# Patient Record
Sex: FEMALE | Race: WHITE | NOT HISPANIC OR LATINO | Employment: OTHER | URBAN - METROPOLITAN AREA
[De-identification: names, ages, dates, MRNs, and addresses within clinical notes are randomized per-mention and may not be internally consistent; named-entity substitution may affect disease eponyms.]

---

## 2017-08-29 ENCOUNTER — TRANSCRIBE ORDERS (OUTPATIENT)
Dept: ADMINISTRATIVE | Facility: HOSPITAL | Age: 67
End: 2017-08-29

## 2017-08-29 DIAGNOSIS — Z12.31 VISIT FOR SCREENING MAMMOGRAM: Primary | ICD-10-CM

## 2017-10-03 ENCOUNTER — HOSPITAL ENCOUNTER (OUTPATIENT)
Dept: RADIOLOGY | Facility: HOSPITAL | Age: 67
Discharge: HOME | End: 2017-10-03
Attending: OBSTETRICS & GYNECOLOGY
Payer: COMMERCIAL

## 2017-10-03 DIAGNOSIS — Z12.31 VISIT FOR SCREENING MAMMOGRAM: ICD-10-CM

## 2017-10-03 PROCEDURE — G0202 SCR MAMMO BI INCL CAD: HCPCS

## 2017-10-12 ENCOUNTER — HOSPITAL ENCOUNTER (OUTPATIENT)
Dept: ULTRASOUND IMAGING | Facility: CLINIC | Age: 67
Discharge: HOME | End: 2017-10-12
Payer: COMMERCIAL

## 2017-10-12 ENCOUNTER — HOSPITAL ENCOUNTER (OUTPATIENT)
Dept: MAMMOGRAPHY | Facility: CLINIC | Age: 67
Discharge: HOME | End: 2017-10-12
Payer: COMMERCIAL

## 2017-10-12 DIAGNOSIS — R92.8 ABNORMAL MAMMOGRAM: ICD-10-CM

## 2017-10-12 PROCEDURE — 76642 ULTRASOUND BREAST LIMITED: CPT

## 2017-10-12 PROCEDURE — G0206 DX MAMMO INCL CAD UNI: HCPCS

## 2017-10-12 PROCEDURE — G0279 TOMOSYNTHESIS, MAMMO: HCPCS

## 2018-03-02 ENCOUNTER — TRANSCRIBE ORDERS (OUTPATIENT)
Dept: ADMINISTRATIVE | Facility: HOSPITAL | Age: 68
End: 2018-03-02

## 2018-03-02 DIAGNOSIS — R92.8 ABNORMAL MAMMOGRAM: Primary | ICD-10-CM

## 2018-04-30 ENCOUNTER — HOSPITAL ENCOUNTER (OUTPATIENT)
Dept: MAMMOGRAPHY | Facility: CLINIC | Age: 68
Discharge: HOME | End: 2018-04-30
Payer: COMMERCIAL

## 2018-04-30 ENCOUNTER — HOSPITAL ENCOUNTER (OUTPATIENT)
Dept: ULTRASOUND IMAGING | Facility: CLINIC | Age: 68
Discharge: HOME | End: 2018-04-30
Payer: COMMERCIAL

## 2018-04-30 DIAGNOSIS — R92.8 ABNORMAL MAMMOGRAM: ICD-10-CM

## 2018-04-30 PROCEDURE — 76642 ULTRASOUND BREAST LIMITED: CPT

## 2018-04-30 PROCEDURE — G0279 TOMOSYNTHESIS, MAMMO: HCPCS

## 2018-04-30 PROCEDURE — 77065 DX MAMMO INCL CAD UNI: CPT

## 2018-07-27 ENCOUNTER — TELEPHONE (OUTPATIENT)
Dept: FAMILY MEDICINE CLINIC | Facility: CLINIC | Age: 68
End: 2018-07-27

## 2018-07-27 NOTE — TELEPHONE ENCOUNTER
Patient called she said that she had fallen about 6 days ago and she said that she hurt her ribs she would like to know if she can have something for pain her # [phone] asked for presp to be called into [facility] at [phone]  Fax [phone] address [address] [place] [place] [address] [place] [address] [place] [address]

## 2018-07-28 ENCOUNTER — TREATMENT (OUTPATIENT)
Dept: FAMILY MEDICINE CLINIC | Facility: CLINIC | Age: 68
End: 2018-07-28

## 2018-07-28 DIAGNOSIS — R52 PAIN: Primary | ICD-10-CM

## 2018-07-28 RX ORDER — TRAMADOL HYDROCHLORIDE 50 MG/1
50 TABLET ORAL EVERY 6 HOURS PRN
Qty: 30 TABLET | Refills: 0 | Status: SHIPPED | OUTPATIENT
Start: 2018-07-28 | End: 2018-10-02 | Stop reason: ALTCHOICE

## 2018-08-22 ENCOUNTER — TELEPHONE (OUTPATIENT)
Dept: FAMILY MEDICINE CLINIC | Facility: CLINIC | Age: 68
End: 2018-08-22

## 2018-08-22 NOTE — TELEPHONE ENCOUNTER
Called patient to inform her that her appointment for 9/28/19 at 56 am with dr Sade Resendez is cancelled he will not be in the office this day and appointment was rescheduled to 10/2/18 at 56 am with dr Sade Resendez patient aware of new appointment date and time

## 2018-09-24 ENCOUNTER — TRANSCRIBE ORDERS (OUTPATIENT)
Dept: ADMINISTRATIVE | Facility: HOSPITAL | Age: 68
End: 2018-09-24

## 2018-09-24 DIAGNOSIS — Z09 FOLLOW UP: Primary | ICD-10-CM

## 2018-10-02 ENCOUNTER — OFFICE VISIT (OUTPATIENT)
Dept: FAMILY MEDICINE CLINIC | Facility: CLINIC | Age: 68
End: 2018-10-02
Payer: COMMERCIAL

## 2018-10-02 VITALS
BODY MASS INDEX: 25.42 KG/M2 | HEIGHT: 60 IN | SYSTOLIC BLOOD PRESSURE: 112 MMHG | WEIGHT: 129.5 LBS | DIASTOLIC BLOOD PRESSURE: 62 MMHG | RESPIRATION RATE: 18 BRPM | HEART RATE: 69 BPM | TEMPERATURE: 96.8 F | OXYGEN SATURATION: 98 %

## 2018-10-02 DIAGNOSIS — I48.92 ATRIAL FLUTTER, UNSPECIFIED TYPE (HCC): Primary | ICD-10-CM

## 2018-10-02 DIAGNOSIS — R73.9 HYPERGLYCEMIA: ICD-10-CM

## 2018-10-02 DIAGNOSIS — D50.9 IRON DEFICIENCY ANEMIA, UNSPECIFIED IRON DEFICIENCY ANEMIA TYPE: ICD-10-CM

## 2018-10-02 DIAGNOSIS — Z86.010 HX OF COLONIC POLYPS: ICD-10-CM

## 2018-10-02 DIAGNOSIS — E78.5 HYPERLIPIDEMIA, UNSPECIFIED HYPERLIPIDEMIA TYPE: ICD-10-CM

## 2018-10-02 DIAGNOSIS — R92.8 ABNORMAL MAMMOGRAM: ICD-10-CM

## 2018-10-02 DIAGNOSIS — Z82.49 FAMILY HISTORY OF ANEURYSM: ICD-10-CM

## 2018-10-02 PROBLEM — Z86.0100 HX OF COLONIC POLYPS: Status: ACTIVE | Noted: 2018-01-26

## 2018-10-02 LAB
EST. AVERAGE GLUCOSE BLD GHB EST-MCNC: 120 MG/DL
HBA1C MFR BLD: 5.8 % (ref 4.2–6.3)

## 2018-10-02 PROCEDURE — 3725F SCREEN DEPRESSION PERFORMED: CPT

## 2018-10-02 PROCEDURE — 83036 HEMOGLOBIN GLYCOSYLATED A1C: CPT | Performed by: FAMILY MEDICINE

## 2018-10-02 PROCEDURE — 99214 OFFICE O/P EST MOD 30 MIN: CPT | Performed by: FAMILY MEDICINE

## 2018-10-02 PROCEDURE — 90662 IIV NO PRSV INCREASED AG IM: CPT

## 2018-10-02 PROCEDURE — G0008 ADMIN INFLUENZA VIRUS VAC: HCPCS

## 2018-10-02 PROCEDURE — G0439 PPPS, SUBSEQ VISIT: HCPCS | Performed by: FAMILY MEDICINE

## 2018-10-02 PROCEDURE — 1125F AMNT PAIN NOTED PAIN PRSNT: CPT

## 2018-10-02 PROCEDURE — 36415 COLL VENOUS BLD VENIPUNCTURE: CPT | Performed by: FAMILY MEDICINE

## 2018-10-02 RX ORDER — LORAZEPAM 0.5 MG/1
1 TABLET ORAL EVERY 6 HOURS PRN
COMMUNITY
Start: 2017-09-26 | End: 2018-10-02 | Stop reason: ALTCHOICE

## 2018-10-02 RX ORDER — ATORVASTATIN CALCIUM 20 MG/1
1 TABLET, FILM COATED ORAL DAILY
COMMUNITY
End: 2018-10-02 | Stop reason: ALTCHOICE

## 2018-10-02 RX ORDER — METOPROLOL SUCCINATE 50 MG/1
1 TABLET, EXTENDED RELEASE ORAL 2 TIMES DAILY
COMMUNITY
Start: 2016-09-22

## 2018-10-02 RX ORDER — HYDROCODONE BITARTRATE AND ACETAMINOPHEN 5; 325 MG/1; MG/1
1 TABLET ORAL EVERY 6 HOURS PRN
COMMUNITY
End: 2018-10-02 | Stop reason: ALTCHOICE

## 2018-10-02 NOTE — PROGRESS NOTES
Assessment and Plan:    Problem List Items Addressed This Visit     Atrial flutter ([address] ) - Primary    Relevant Medications    metoprolol succinate (TOPROL-XL) 50 mg 24 hr tablet    metoprolol tartrate (LOPRESSOR) 25 mg tablet    Hx of colonic polyps    Hyperlipidemia     intollerant of atorva ;     FR = 7%          Iron deficiency anemia      Other Visit Diagnoses     Family history of aneurysm        Relevant Orders    influenza vaccine, 0340-8001, high-dose, PF 0 5 mL, for patients 65 yr+ (FLUZONE HIGH-DOSE)     abdominal aorta screening aaa        Health Maintenance Due   Topic Date Due    Depression Screening PHQ  1950    Fall Risk  11/03/2013    Urinary Incontinence Screening  11/03/2013    INFLUENZA VACCINE  09/01/2018         HPI:  Juan Ledbetter is a 69 y o  female here for her Subsequent Wellness Visit  Patient Active Problem List   Diagnosis    Atrial flutter ([facility] [address] )    Hx of colonic polyps    Hyperlipidemia    Iron deficiency anemia     Past Medical History:   Diagnosis Date    Bulging of cervical intervertebral disc     Bulging disk neck      Colon polyp 03/2009    History of narrow angle glaucoma 2012    SVT (supraventricular tachycardia) (HCC)     Palpitations      Past Surgical History:   Procedure Laterality Date    COLONOSCOPY  03/25/2009    POLYPECTOMY  03/25/2009    REFRACTIVE SURGERY       Family History   Problem Relation Age of Onset    Cervical polyp Father     Prostate cancer Father     Prostate cancer Brother     Kidney cancer Brother     Cancer Family         Colorectal and polyps     History   Smoking Status    Never Smoker   Smokeless Tobacco    Never Used     History   Alcohol Use    Yes     Comment: occasional      History   Drug Use No       Current Outpatient Prescriptions   Medication Sig Dispense Refill    aspirin 81 MG tablet Take 1 tablet by mouth every 24 hours      metoprolol succinate (TOPROL-XL) 50 mg 24 hr tablet Take 1 tablet by mouth 2 (two) times a day        metoprolol tartrate (LOPRESSOR) 25 mg tablet Take 1 tablet by mouth daily as needed        Multiple Vitamins-Minerals (MULTIVITAMIN ADULT PO) Take 1 tablet by mouth daily       No current facility-administered medications for this visit  Allergies   Allergen Reactions    Sulfamethoxazole      Other reaction(s): Rash    Trimethoprim Rash    Codeine Nausea Only and Other (See Comments)     Other reaction(s): GI Intolerance  N/V  Other reaction(s): Nausea & vomiting  Other reaction(s): Other (See Comments)  Other reaction(s): GI Intolerance  N/V    Latex Itching    Other Itching    Sulfa Antibiotics Swelling    Sulfamethoxazole-Trimethoprim Other (See Comments)     facial swelling  Other reaction(s): Other (See Comments)  facial swelling     Immunization History   Administered Date(s) Administered    Influenza 09/28/2015, 09/22/2016, 09/26/2017    Influenza Split 01/16/2014    Influenza Split High Dose Preservative Free IM 09/22/2016, 09/26/2017    Influenza TIV (IM) 11/13/2012, 10/27/2014, 09/28/2015    Pneumococcal Conjugate 13-Valent 09/28/2015    Pneumococcal Polysaccharide PPV23 10/27/2014    Tdap 12/31/2015       Patient Care Team:  Trinidad Wong MD as PCP - General    Medicare Screening Tests and Risk Assessments:  Last Medicare Wellness visit information reviewed, patient interviewed, no change since last AWV  Health Risk Assessment:  Patient rates overall health as excellent  Patient feels that their physical health rating is Much better  Eyesight was rated as Same  Hearing was rated as Same  Patient feels that their emotional and mental health rating is Much better  Pain experienced by patient in the last 7 days has been None  Patient states that she has experienced no weight loss or gain in last 6 months  Emotional/Mental Health:  Patient has been feeling nervous/anxious      PHQ-9 Depression Screening:    Frequency of the following problems over the past two weeks:      1  Little interest or pleasure in doing things: 0 - not at all      2  Feeling down, depressed, or hopeless: 0 - not at all  PHQ-2 Score: 0          Broken Bones/Falls: Fall Risk Assessment:    In the past year, patient has experienced: No history of falling in past year          Bladder/Bowel:  Patient has not leaked urine accidently in the last six months  Patient reports no loss of bowel control  Immunizations:  Patient has had a flu vaccination within the last year  Patient has received a pneumonia shot  Patient has not received a shingles shot  Patient has received tetanus/diphtheria shot  Home Safety:  Patient does not have trouble with stairs inside or outside of their home  Patient currently reports that there are safety hazards present in home , working smoke alarms, working carbon monoxide detectors  Preventative Screening/Counseling:      Cardiovascular:      General: Screening Current          Diabetes:      General: Screening Current          Colorectal Cancer:      General: Screening Current          Breast Cancer:      General: Screening Current          Cervical Cancer:      General: Screening Current          Osteoporosis:      General: Risks and Benefits Discussed      Comments: Pt wants to wait one yr to repeat the dexa        AAA:      Family history of abdominal aortic aneurysm  General: Screening Not Indicated      Comments:  Mother w/ same         Glaucoma:      General: Screening Current          HIV:      General: Screening Not Indicated          Hepatitis C:      General: Screening Not Indicated        Advanced Directives:   Patient has living will for healthcare,

## 2018-10-11 ENCOUNTER — HOSPITAL ENCOUNTER (OUTPATIENT)
Dept: ULTRASOUND IMAGING | Facility: CLINIC | Age: 68
Discharge: HOME | End: 2018-10-11
Payer: COMMERCIAL

## 2018-10-11 ENCOUNTER — HOSPITAL ENCOUNTER (OUTPATIENT)
Dept: MAMMOGRAPHY | Facility: CLINIC | Age: 68
Discharge: HOME | End: 2018-10-11
Payer: COMMERCIAL

## 2018-10-11 DIAGNOSIS — R92.2 INCONCLUSIVE MAMMOGRAPHY: ICD-10-CM

## 2018-10-11 DIAGNOSIS — R92.8 ABNORMAL MAMMOGRAM: ICD-10-CM

## 2018-10-11 PROCEDURE — 77066 DX MAMMO INCL CAD BI: CPT

## 2018-10-11 PROCEDURE — 76642 ULTRASOUND BREAST LIMITED: CPT

## 2018-10-11 PROCEDURE — G0279 TOMOSYNTHESIS, MAMMO: HCPCS

## 2018-10-17 ENCOUNTER — OFFICE VISIT (OUTPATIENT)
Dept: FAMILY MEDICINE CLINIC | Facility: CLINIC | Age: 68
End: 2018-10-17
Payer: COMMERCIAL

## 2018-10-17 VITALS
OXYGEN SATURATION: 92 % | SYSTOLIC BLOOD PRESSURE: 118 MMHG | RESPIRATION RATE: 18 BRPM | DIASTOLIC BLOOD PRESSURE: 68 MMHG | HEART RATE: 75 BPM | TEMPERATURE: 98.4 F

## 2018-10-17 DIAGNOSIS — M67.40 GANGLION: Primary | ICD-10-CM

## 2018-10-17 PROCEDURE — 99213 OFFICE O/P EST LOW 20 MIN: CPT | Performed by: FAMILY MEDICINE

## 2018-10-17 PROCEDURE — 4040F PNEUMOC VAC/ADMIN/RCVD: CPT | Performed by: FAMILY MEDICINE

## 2018-10-17 NOTE — PROGRESS NOTES
Assessment/Plan:    No problem-specific Assessment & Plan notes found for this encounter  Diagnoses and all orders for this visit:    Ganglion          Subjective:      Patient ID: Paris Yu is a 69 y o  female  Patient comes today with a 5 or 6 week history of a mass on the left palm  The following portions of the patient's history were reviewed and updated as appropriate: allergies, current medications, past family history, past medical history, past social history, past surgical history and problem list     Review of Systems   Musculoskeletal:        Hand pain : no locking          Objective:  Vitals:    10/17/18 1017   BP: 118/68   BP Location: Left arm   Patient Position: Sitting   Cuff Size: Standard   Pulse: 75   Resp: 18   Temp: 98 4 °F (36 9 °C)   TempSrc: Temporal   SpO2: 92%      Physical Exam   Skin:   Ganglion cyst of L hand          Ganglion cyst   Try to give it another 4-6 weeks to see if it will resolve  Otherwise a hand surgeon could help

## 2018-10-30 LAB
ALBUMIN SERPL-MCNC: 4 G/DL (ref 3.6–5.1)
ALBUMIN/GLOB SERPL: 1.4 (CALC) (ref 1–2.5)
ALP SERPL-CCNC: 86 U/L (ref 33–130)
ALT SERPL-CCNC: 35 U/L (ref 6–29)
AST SERPL-CCNC: 37 U/L (ref 10–35)
BILIRUB DIRECT SERPL-MCNC: 0.1 MG/DL
BILIRUB INDIRECT SERPL-MCNC: 0.4 MG/DL (CALC) (ref 0.2–1.2)
BILIRUB SERPL-MCNC: 0.5 MG/DL (ref 0.2–1.2)
GLOBULIN SER CALC-MCNC: 2.8 G/DL (CALC) (ref 1.9–3.7)
PROT SERPL-MCNC: 6.8 G/DL (ref 6.1–8.1)

## 2018-11-09 ENCOUNTER — TELEPHONE (OUTPATIENT)
Dept: FAMILY MEDICINE CLINIC | Facility: CLINIC | Age: 68
End: 2018-11-09

## 2018-11-09 ENCOUNTER — TREATMENT (OUTPATIENT)
Dept: FAMILY MEDICINE CLINIC | Facility: CLINIC | Age: 68
End: 2018-11-09

## 2018-11-09 DIAGNOSIS — R79.89 ELEVATED LFTS: Primary | ICD-10-CM

## 2018-12-27 LAB
HCV AB S/CO SERPL IA: 0.01
HCV AB SERPL QL IA: NORMAL

## 2019-03-21 ENCOUNTER — TRANSCRIBE ORDERS (OUTPATIENT)
Dept: ADMINISTRATIVE | Facility: HOSPITAL | Age: 69
End: 2019-03-21

## 2019-03-21 DIAGNOSIS — N64.59 ABNORMAL BREAST EXAM: Primary | ICD-10-CM

## 2019-04-09 ENCOUNTER — TELEPHONE (OUTPATIENT)
Dept: FAMILY MEDICINE CLINIC | Facility: CLINIC | Age: 69
End: 2019-04-09

## 2019-04-19 ENCOUNTER — HOSPITAL ENCOUNTER (OUTPATIENT)
Dept: MAMMOGRAPHY | Facility: CLINIC | Age: 69
Discharge: HOME | End: 2019-04-19
Payer: COMMERCIAL

## 2019-04-19 ENCOUNTER — HOSPITAL ENCOUNTER (OUTPATIENT)
Dept: ULTRASOUND IMAGING | Facility: CLINIC | Age: 69
Discharge: HOME | End: 2019-04-19
Payer: COMMERCIAL

## 2019-04-19 ENCOUNTER — TRANSCRIBE ORDERS (OUTPATIENT)
Dept: ADMINISTRATIVE | Facility: HOSPITAL | Age: 69
End: 2019-04-19

## 2019-04-19 VITALS — BODY MASS INDEX: 25.32 KG/M2 | HEIGHT: 60 IN | WEIGHT: 129 LBS

## 2019-04-19 DIAGNOSIS — N64.59 ABNORMAL BREAST EXAM: ICD-10-CM

## 2019-04-19 DIAGNOSIS — Z09 FOLLOW-UP EXAM, 3-6 MONTHS SINCE PREVIOUS EXAM: Primary | ICD-10-CM

## 2019-04-19 PROCEDURE — G0279 TOMOSYNTHESIS, MAMMO: HCPCS

## 2019-04-19 PROCEDURE — 77065 DX MAMMO INCL CAD UNI: CPT

## 2019-10-21 ENCOUNTER — HOSPITAL ENCOUNTER (OUTPATIENT)
Dept: MAMMOGRAPHY | Facility: CLINIC | Age: 69
Discharge: HOME | End: 2019-10-21
Payer: COMMERCIAL

## 2019-10-21 VITALS — WEIGHT: 128 LBS | BODY MASS INDEX: 22.68 KG/M2 | HEIGHT: 63 IN

## 2019-10-21 DIAGNOSIS — Z09 FOLLOW-UP EXAM, 3-6 MONTHS SINCE PREVIOUS EXAM: ICD-10-CM

## 2019-10-21 PROCEDURE — G0279 TOMOSYNTHESIS, MAMMO: HCPCS

## 2019-10-21 PROCEDURE — 77066 DX MAMMO INCL CAD BI: CPT

## 2019-11-14 ENCOUNTER — OFFICE VISIT (OUTPATIENT)
Dept: FAMILY MEDICINE CLINIC | Facility: CLINIC | Age: 69
End: 2019-11-14
Payer: COMMERCIAL

## 2019-11-14 VITALS
DIASTOLIC BLOOD PRESSURE: 82 MMHG | TEMPERATURE: 98.2 F | OXYGEN SATURATION: 96 % | WEIGHT: 130.6 LBS | HEART RATE: 70 BPM | SYSTOLIC BLOOD PRESSURE: 140 MMHG | HEIGHT: 62 IN | BODY MASS INDEX: 24.03 KG/M2

## 2019-11-14 DIAGNOSIS — Z86.010 HX OF COLONIC POLYPS: ICD-10-CM

## 2019-11-14 DIAGNOSIS — I48.92 ATRIAL FLUTTER, UNSPECIFIED TYPE (HCC): ICD-10-CM

## 2019-11-14 DIAGNOSIS — Z23 IMMUNIZATION DUE: Primary | ICD-10-CM

## 2019-11-14 DIAGNOSIS — E78.5 HYPERLIPIDEMIA, UNSPECIFIED HYPERLIPIDEMIA TYPE: ICD-10-CM

## 2019-11-14 DIAGNOSIS — D50.9 IRON DEFICIENCY ANEMIA, UNSPECIFIED IRON DEFICIENCY ANEMIA TYPE: ICD-10-CM

## 2019-11-14 DIAGNOSIS — Z82.49 FAMILY HISTORY OF ABDOMINAL AORTIC ANEURYSM: ICD-10-CM

## 2019-11-14 PROCEDURE — 3725F SCREEN DEPRESSION PERFORMED: CPT

## 2019-11-14 PROCEDURE — G0008 ADMIN INFLUENZA VIRUS VAC: HCPCS

## 2019-11-14 PROCEDURE — G0439 PPPS, SUBSEQ VISIT: HCPCS | Performed by: FAMILY MEDICINE

## 2019-11-14 PROCEDURE — 99214 OFFICE O/P EST MOD 30 MIN: CPT | Performed by: FAMILY MEDICINE

## 2019-11-14 PROCEDURE — 90662 IIV NO PRSV INCREASED AG IM: CPT

## 2019-11-14 NOTE — PROGRESS NOTES
Assessment and Plan:     Problem List Items Addressed This Visit     None      Visit Diagnoses     Immunization due    -  Primary           Preventive health issues were discussed with patient, and age appropriate screening tests were ordered as noted in patient's After Visit Summary  Personalized health advice and appropriate referrals for health education or preventive services given if needed, as noted in patient's After Visit Summary  History of Present Illness:     Patient presents for Medicare Annual Wellness visit    Patient Care Team:  Shukri Kim MD as PCP - General     Problem List:     Patient Active Problem List   Diagnosis    Atrial flutter ([address] )    Hx of colonic polyps    Hyperlipidemia    Iron deficiency anemia    Abnormal mammogram    Family history of aneurysm      Past Medical and Surgical History:     Past Medical History:   Diagnosis Date    Bulging of cervical intervertebral disc     Bulging disk neck      Colon polyp 03/2009    History of narrow angle glaucoma 2012    SVT (supraventricular tachycardia) (HCC)     Palpitations      Past Surgical History:   Procedure Laterality Date    BREAST BIOPSY Right 2005    Stereo    COLONOSCOPY  03/25/2009    POLYPECTOMY  03/25/2009    REFRACTIVE SURGERY        Family History:     Family History   Problem Relation Age of Onset    Cervical polyp Father     Prostate cancer Father     Colon cancer Father     Prostate cancer Brother     Kidney cancer Brother     Cancer Family         Colorectal and polyps    Colon cancer Sister 61    Colon cancer Paternal Grandmother 64    Ovarian cancer Cousin 48      Social History:     Social History     Socioeconomic History    Marital status: /Civil Union     Spouse name: None    Number of children: None    Years of education: None    Highest education level: None   Occupational History    None   Social Needs    Financial resource strain: None    Food insecurity:     Worry: None     Inability: None    Transportation needs:     Medical: None     Non-medical: None   Tobacco Use    Smoking status: Never Smoker    Smokeless tobacco: Never Used   Substance and Sexual Activity    Alcohol use: Yes     Frequency: Monthly or less     Comment: occasional    Drug use: No    Sexual activity: None   Lifestyle    Physical activity:     Days per week: None     Minutes per session: None    Stress: None   Relationships    Social connections:     Talks on phone: None     Gets together: None     Attends [employer] service: None     Active member of club or organization: None     Attends meetings of clubs or organizations: None     Relationship status: None    Intimate partner violence:     Fear of current or ex partner: None     Emotionally abused: None     Physically abused: None     Forced sexual activity: None   Other Topics Concern    None   Social History Narrative    None       Medications and Allergies:     Current Outpatient Medications   Medication Sig Dispense Refill    metoprolol succinate (TOPROL-XL) 50 mg 24 hr tablet Take 1 tablet by mouth 2 (two) times a day        metoprolol tartrate (LOPRESSOR) 25 mg tablet Take 1 tablet by mouth daily as needed        Multiple Vitamins-Minerals (MULTIVITAMIN ADULT PO) Take 1 tablet by mouth daily       No current facility-administered medications for this visit  Allergies   Allergen Reactions    Sulfa Antibiotics Swelling and Facial Swelling     Other reaction(s): Rash    Sulfamethoxazole      Other reaction(s): Rash    Trimethoprim Rash    Codeine Nausea Only and Other (See Comments)     Other reaction(s): GI Intolerance  N/V  Other reaction(s): Nausea & vomiting  Other reaction(s):  Other (See Comments)  Other reaction(s): GI Intolerance  N/V  Other reaction(s): Free Text, GI Intolerance, Nausea & vomiting  Other reaction(s): Nausea and Vomiting    Latex Itching    Other Itching    Sulfamethoxazole-Trimethoprim Other (See Comments)     facial swelling  Other reaction(s): Other (See Comments)  facial swelling      Immunizations:     Immunization History   Administered Date(s) Administered    INFLUENZA 09/28/2015, 09/22/2016, 09/26/2017    Influenza Split 01/16/2014    Influenza Split High Dose Preservative Free IM 09/22/2016, 09/26/2017    Influenza TIV (IM) 11/13/2012, 10/27/2014, 09/28/2015    Influenza, high dose seasonal 0 5 mL 10/02/2018    Pneumococcal Conjugate 13-Valent 09/05/2015    Pneumococcal Polysaccharide PPV23 10/27/2014    Tdap 12/31/2015      Health Maintenance:         Topic Date Due    MAMMOGRAM  10/21/2020    CRC Screening: Colonoscopy  03/13/2023    Hepatitis C Screening  Completed         Topic Date Due    MMR Vaccine (1 of 1 - Standard series) 11/03/1951    Varicella Vaccine (1 of 2 - 2-dose childhood series) 11/03/1951    Influenza Vaccine  07/01/2019      Medicare Health Risk Assessment:     Ht 5' 2" (1 575 m)   Wt 59 2 kg (130 lb 9 6 oz)   BMI 23 89 kg/m²      Joseph Perdue is here for her Subsequent Wellness visit  Last Medicare Wellness visit information reviewed, patient interviewed and updates made to the record today  Health Risk Assessment:   Patient rates overall health as good  Patient feels that their physical health rating is same  Eyesight was rated as same  Hearing was rated as same  Patient feels that their emotional and mental health rating is same  Pain experienced in the last 7 days has been none  Patient states that she has experienced no weight loss or gain in last 6 months  Depression Screening:   PHQ-2 Score: 0      Fall Risk Screening: In the past year, patient has experienced: no history of falling in past year      Urinary Incontinence Screening:   Patient has not leaked urine accidently in the last six months  Home Safety:  Patient does not have trouble with stairs inside or outside of their home   Patient has working smoke alarms and has no working carbon monoxide detector  Home safety hazards include: none  Nutrition:   Current diet is Regular  Medications:   Patient is currently taking over-the-counter supplements  OTC medications include: see medication list  Patient is able to manage medications  Activities of Daily Living (ADLs)/Instrumental Activities of Daily Living (IADLs):   Walk and transfer into and out of bed and chair?: Yes  Dress and groom yourself?: Yes    Bathe or shower yourself?: Yes    Feed yourself?  Yes  Do your laundry/housekeeping?: Yes  Manage your money, pay your bills and track your expenses?: Yes  Make your own meals?: Yes    Do your own shopping?: Yes    Previous Hospitalizations:   Any hospitalizations or ED visits within the last 12 months?: No      PREVENTIVE SCREENINGS      Cardiovascular Screening:    General: Screening Not Indicated and History Lipid Disorder      Colorectal Cancer Screening:     General: Screening Current      Breast Cancer Screening:     General: Screening Current      Cervical Cancer Screening:    General: Screening Not Indicated      Hepatitis C Screening:    General: Screening Current      Yuly Redd MD

## 2019-11-14 NOTE — PATIENT INSTRUCTIONS
Medicare Preventive Visit Patient Instructions  Thank you for completing your Welcome to Medicare Visit or Medicare Annual Wellness Visit today  Your next wellness visit will be due in one year (11/14/2020)  The screening/preventive services that you may require over the next 5-10 years are detailed below  Some tests may not apply to you based off risk factors and/or age  Screening tests ordered at today's visit but not completed yet may show as past due  Also, please note that scanned in results may not display below  Preventive Screenings:  Service Recommendations Previous Testing/Comments   Colorectal Cancer Screening  * Colonoscopy    * Fecal Occult Blood Test (FOBT)/Fecal Immunochemical Test (FIT)  * Fecal DNA/Cologuard Test  * Flexible Sigmoidoscopy Age: 54-65 years old   Colonoscopy: every 10 years (may be performed more frequently if at higher risk)  OR  FOBT/FIT: every 1 year  OR  Cologuard: every 3 years  OR  Sigmoidoscopy: every 5 years  Screening may be recommended earlier than age 48 if at higher risk for colorectal cancer  Also, an individualized decision between you and your healthcare provider will decide whether screening between the ages of 74-80 would be appropriate  Colonoscopy: 03/13/2018  FOBT/FIT: Not on file  Cologuard: Not on file  Sigmoidoscopy: Not on file    Screening Current     Breast Cancer Screening Age: 36 years old  Frequency: every 1-2 years  Not required if history of left and right mastectomy Mammogram: 10/21/2019    Screening Current   Cervical Cancer Screening Between the ages of 21-29, pap smear recommended once every 3 years  Between the ages of 33-67, can perform pap smear with HPV co-testing every 5 years     Recommendations may differ for women with a history of total hysterectomy, cervical cancer, or abnormal pap smears in past  Pap Smear: Not on file    Screening Not Indicated   Hepatitis C Screening Once for adults born between 1945 and 1965  More frequently in patients at high risk for Hepatitis C Hep C Antibody: 12/26/2018    Screening Current   Diabetes Screening 1-2 times per year if you're at risk for diabetes or have pre-diabetes Fasting glucose: No results in last 5 years   A1C: 5 8 %       Cholesterol Screening Once every 5 years if you don't have a lipid disorder  May order more often based on risk factors  Lipid panel: 04/21/2018    Screening Not Indicated  History Lipid Disorder     Other Preventive Screenings Covered by Medicare:  1  Abdominal Aortic Aneurysm (AAA) Screening: covered once if your at risk  You're considered to be at risk if you have a family history of AAA  2  Lung Cancer Screening: covers low dose CT scan once per year if you meet all of the following conditions: (1) Age 50-69; (2) No signs or symptoms of lung cancer; (3) Current smoker or have quit smoking within the last 15 years; (4) You have a tobacco smoking history of at least 30 pack years (packs per day multiplied by number of years you smoked); (5) You get a written order from a healthcare provider  3  Glaucoma Screening: covered annually if you're considered high risk: (1) You have diabetes OR (2) Family history of glaucoma OR (3)  aged 48 and older OR (3)  American aged 72 and older  3  Osteoporosis Screening: covered every 2 years if you meet one of the following conditions: (1) You're estrogen deficient and at risk for osteoporosis based off medical history and other findings; (2) Have a vertebral abnormality; (3) On glucocorticoid therapy for more than 3 months; (4) Have primary hyperparathyroidism; (5) On osteoporosis medications and need to assess response to drug therapy  · Last bone density test (DXA Scan): Not on file  5  HIV Screening: covered annually if you're between the age of 12-76  Also covered annually if you are younger than 13 and older than 72 with risk factors for HIV infection   For pregnant patients, it is covered up to 3 times per pregnancy  Immunizations:  Immunization Recommendations   Influenza Vaccine Annual influenza vaccination during flu season is recommended for all persons aged >= 6 months who do not have contraindications   Pneumococcal Vaccine (Prevnar and Pneumovax)  * Prevnar = PCV13  * Pneumovax = PPSV23   Adults 25-60 years old: 1-3 doses may be recommended based on certain risk factors  Adults 72 years old: Prevnar (PCV13) vaccine recommended followed by Pneumovax (PPSV23) vaccine  If already received PPSV23 since turning 65, then PCV13 recommended at least one year after PPSV23 dose  Hepatitis B Vaccine 3 dose series if at intermediate or high risk (ex: diabetes, end stage renal disease, liver disease)   Tetanus (Td) Vaccine - COST NOT COVERED BY MEDICARE PART B Following completion of primary series, a booster dose should be given every 10 years to maintain immunity against tetanus  Td may also be given as tetanus wound prophylaxis  Tdap Vaccine - COST NOT COVERED BY MEDICARE PART B Recommended at least once for all adults  For pregnant patients, recommended with each pregnancy  Shingles Vaccine (Shingrix) - COST NOT COVERED BY MEDICARE PART B  2 shot series recommended in those aged 48 and above     Health Maintenance Due:      Topic Date Due    MAMMOGRAM  10/21/2020    CRC Screening: Colonoscopy  03/13/2023    Hepatitis C Screening  Completed     Immunizations Due:      Topic Date Due    MMR Vaccine (1 of 1 - Standard series) 11/03/1951    Varicella Vaccine (1 of 2 - 2-dose childhood series) 11/03/1951    Influenza Vaccine  07/01/2019     Advance Directives   What are advance directives? Advance directives are legal documents that state your wishes and plans for medical care  These plans are made ahead of time in case you lose your ability to make decisions for yourself  Advance directives can apply to any medical decision, such as the treatments you want, and if you want to donate organs     What are the types of advance directives? There are many types of advance directives, and each state has rules about how to use them  You may choose a combination of any of the following:  · Living will: This is a written record of the treatment you want  You can also choose which treatments you do not want, which to limit, and which to stop at a certain time  This includes surgery, medicine, IV fluid, and tube feedings  · Durable power of  for healthcare [facility]): This is a written record that states who you want to make healthcare choices for you when you are unable to make them for yourself  This person, called a proxy, is usually a family member or a friend  You may choose more than 1 proxy  · Do not resuscitate (DNR) order:  A DNR order is used in case your heart stops beating or you stop breathing  It is a request not to have certain forms of treatment, such as CPR  A DNR order may be included in other types of advance directives  · Medical directive: This covers the care that you want if you are in a coma, near death, or unable to make decisions for yourself  You can list the treatments you want for each condition  Treatment may include pain medicine, surgery, blood transfusions, dialysis, IV or tube feedings, and a ventilator (breathing machine)  · Values history: This document has questions about your views, beliefs, and how you feel and think about life  This information can help others choose the care that you would choose  Why are advance directives important? An advance directive helps you control your care  Although spoken wishes may be used, it is better to have your wishes written down  Spoken wishes can be misunderstood, or not followed  Treatments may be given even if you do not want them  An advance directive may make it easier for your family to make difficult choices about your care        © Copyright [employer] 2018 Information is for End User's use only and may not be sold, redistributed or otherwise used for commercial purposes  All illustrations and images included in CareNotes® are the copyrighted property of A D A M , Inc  or [place]  Consider trying CBD topical products for any arthritic issues  The following web site is a good one : "cbdifferent"   WE RECOMMEND GETTING THE 2- DOSE SHINGLES VACCINE ([address]) FROM YOUR PHARMACY  CHECK WITH THEM ON THE COST  YOU SHOULD HAVE THIS IF OVER AGE 48, EVEN IF YOU HAVE HAD THE ORIGINAL VACCINE (ZOSTRIX)

## 2019-11-14 NOTE — PROGRESS NOTES
Assessment/Plan:    No problem-specific Assessment & Plan notes found for this encounter  Diagnoses and all orders for this visit:    Immunization due  -     influenza vaccine, 3767-1733, high-dose, PF 0 5 mL (FLUZONE HIGH-DOSE)    Atrial flutter, unspecified type ([facility] [address] )    Hx of colonic polyps    Hyperlipidemia, unspecified hyperlipidemia type    Iron deficiency anemia, unspecified iron deficiency anemia type          Subjective:      Patient ID: Temitope Argueta is a 68 y o  female  PATIENT RETURNS FOR FOLLOW-UP OF CHRONIC MEDICAL CONDITIONS  NO HOSPITAL STAYS OR EMERGENCY VISITS RECENTLY  MEDS WERE REVIEWED AND NO SIDE EFFECTS  NO NEW ISSUES  UNLESS NOTED BELOW  NO NEW MEDICAL PROVIDER REPORTED  THE CHRONIC DISEASES LISTED ABOVE ARE STABLE AND UNCHANGED/ THE PLAN OF CARE FOR THOSE WILL REMAIN UNCHANGED UNLESS NOTED BELOW  Awv/sub    Still sees cardio       The following portions of the patient's history were reviewed and updated as appropriate: allergies, current medications, past family history, past medical history, past social history, past surgical history and problem list     Review of Systems   Constitutional: Negative for activity change and appetite change  HENT: Negative for voice change  Eyes: Negative for visual disturbance  Respiratory: Negative for chest tightness and shortness of breath  Cardiovascular: Negative for chest pain, palpitations and leg swelling  Gastrointestinal: Negative for abdominal pain, blood in stool, constipation and diarrhea  Genitourinary: Negative for dysuria, vaginal bleeding and vaginal discharge  Skin: Negative for rash  Neurological: Negative for dizziness  Psychiatric/Behavioral: Negative for dysphoric mood           Objective:  Vitals:    11/14/19 1121   BP: 140/82   BP Location: Left arm   Patient Position: Sitting   Cuff Size: Adult   Pulse: 70   Temp: 98 2 °F (36 8 °C)   TempSrc: Temporal   SpO2: 96%   Weight: 59 2 kg (130 lb 9 6 oz)   Height: 5' 2" (1 575 m)      Physical Exam   Constitutional: She appears well-developed and well-nourished  HENT:   Head: Normocephalic and atraumatic  Eyes: Conjunctivae are normal    Neck: Neck supple  No thyromegaly present  Cardiovascular: Normal rate, regular rhythm, normal heart sounds and intact distal pulses  No murmur heard  Pulmonary/Chest: Effort normal and breath sounds normal  No respiratory distress  Musculoskeletal: She exhibits no edema  Lymphadenopathy:     She has no cervical adenopathy  Skin: Skin is warm and dry  Psychiatric: She has a normal mood and affect  Her behavior is normal          Patient's chronic problems that were reviewed today are stable  Meds reviewed and no changes made  Appropriate labs and imaging were ordered  Preventive measures appropriate for age and sex were reviewed with patient  Immunizations were updated as appropriate  Assessment and Plan:     Problem List Items Addressed This Visit        Cardiovascular and Mediastinum    Atrial flutter ([address] )       Other    Hx of colonic polyps    Hyperlipidemia    Iron deficiency anemia      Other Visit Diagnoses     Immunization due    -  Primary           Preventive health issues were discussed with patient, and age appropriate screening tests were ordered as noted in patient's After Visit Summary  Personalized health advice and appropriate referrals for health education or preventive services given if needed, as noted in patient's After Visit Summary       History of Present Illness:     Patient presents for Medicare Annual Wellness visit    Patient Care Team:  Romain Moctezuma MD as PCP - General     Problem List:     Patient Active Problem List   Diagnosis    Atrial flutter ([address] )    Hx of colonic polyps    Hyperlipidemia    Iron deficiency anemia    Abnormal mammogram    Family history of aneurysm      Past Medical and Surgical History:     Past Medical History:   Diagnosis Date    Bulging of cervical intervertebral disc     Bulging disk neck      Colon polyp 03/2009    History of narrow angle glaucoma 2012    SVT (supraventricular tachycardia) (HCC)     Palpitations      Past Surgical History:   Procedure Laterality Date    BREAST BIOPSY Right 2005    Stereo    COLONOSCOPY  03/25/2009    POLYPECTOMY  03/25/2009    REFRACTIVE SURGERY        Family History:     Family History   Problem Relation Age of Onset    Cervical polyp Father     Prostate cancer Father     Colon cancer Father     Prostate cancer Brother     Kidney cancer Brother     Cancer Family         Colorectal and polyps    Colon cancer Sister 61    Colon cancer Paternal Grandmother 64    Ovarian cancer Cousin 48      Social History:     Social History     Socioeconomic History    Marital status: /Civil Union     Spouse name: None    Number of children: None    Years of education: None    Highest education level: None   Occupational History    None   Social Needs    Financial resource strain: None    Food insecurity:     Worry: None     Inability: None    Transportation needs:     Medical: None     Non-medical: None   Tobacco Use    Smoking status: Never Smoker    Smokeless tobacco: Never Used   Substance and Sexual Activity    Alcohol use: Yes     Frequency: Monthly or less     Comment: occasional    Drug use: No    Sexual activity: None   Lifestyle    Physical activity:     Days per week: None     Minutes per session: None    Stress: None   Relationships    Social connections:     Talks on phone: None     Gets together: None     Attends Jewish service: None     Active member of club or organization: None     Attends meetings of clubs or organizations: None     Relationship status: None    Intimate partner violence:     Fear of current or ex partner: None     Emotionally abused: None     Physically abused: None     Forced sexual activity: None   Other Topics Concern    None   Social History Narrative  None       Medications and Allergies:     Current Outpatient Medications   Medication Sig Dispense Refill    metoprolol succinate (TOPROL-XL) 50 mg 24 hr tablet Take 1 tablet by mouth 2 (two) times a day        metoprolol tartrate (LOPRESSOR) 25 mg tablet Take 1 tablet by mouth daily as needed        Multiple Vitamins-Minerals (MULTIVITAMIN ADULT PO) Take 1 tablet by mouth daily       No current facility-administered medications for this visit  Allergies   Allergen Reactions    Sulfa Antibiotics Swelling and Facial Swelling     Other reaction(s): Rash    Sulfamethoxazole      Other reaction(s): Rash    Trimethoprim Rash    Codeine Nausea Only and Other (See Comments)     Other reaction(s): GI Intolerance  N/V  Other reaction(s): Nausea & vomiting  Other reaction(s): Other (See Comments)  Other reaction(s): GI Intolerance  N/V  Other reaction(s): Free Text, GI Intolerance, Nausea & vomiting  Other reaction(s): Nausea and Vomiting    Latex Itching    Other Itching    Sulfamethoxazole-Trimethoprim Other (See Comments)     facial swelling  Other reaction(s):  Other (See Comments)  facial swelling      Immunizations:     Immunization History   Administered Date(s) Administered    INFLUENZA 09/28/2015, 09/22/2016, 09/26/2017    Influenza Split 01/16/2014    Influenza Split High Dose Preservative Free IM 09/22/2016, 09/26/2017    Influenza TIV (IM) 11/13/2012, 10/27/2014, 09/28/2015    Influenza, high dose seasonal 0 5 mL 10/02/2018    Pneumococcal Conjugate 13-Valent 09/05/2015    Pneumococcal Polysaccharide PPV23 10/27/2014    Tdap 12/31/2015      Health Maintenance:         Topic Date Due    MAMMOGRAM  10/21/2020    CRC Screening: Colonoscopy  03/13/2023    Hepatitis C Screening  Completed         Topic Date Due    MMR Vaccine (1 of 1 - Standard series) 11/03/1951    Varicella Vaccine (1 of 2 - 2-dose childhood series) 11/03/1951    Influenza Vaccine  07/01/2019      Medicare Health Risk Assessment:     /82 (BP Location: Left arm, Patient Position: Sitting, Cuff Size: Adult)   Pulse 70   Temp 98 2 °F (36 8 °C) (Temporal)   Ht 5' 2" (1 575 m)   Wt 59 2 kg (130 lb 9 6 oz)   SpO2 96%   BMI 23 89 kg/m²      Kate Bell is here for her Subsequent Wellness visit  Last Medicare Wellness visit information reviewed, patient interviewed, no change since last AWV  Depression Screening:   PHQ-2 Score: 0      Previous Hospitalizations:   Any hospitalizations or ED visits within the last 12 months?: No      Advance Care Planning:   Living will: Yes    Durable POA for healthcare: Yes    Advanced directive: Yes      Cognitive Screening:   Provider or family/friend/caregiver concerned regarding cognition?: No    PREVENTIVE SCREENINGS      Cardiovascular Screening:    General: Screening Not Indicated and History Lipid Disorder      Colorectal Cancer Screening:     General: Screening Current      Breast Cancer Screening:     General: Screening Current      Cervical Cancer Screening:    General: Screening Not Indicated      Abdominal Aortic Aneurysm (AAA) Screening:        General: Screening Not Indicated      Lung Cancer Screening:     General: Screening Not Indicated      Hepatitis C Screening:    General: Screening Current    Other Counseling Topics:   Regular weightbearing exercise         Jaja Staley MD

## 2019-11-15 ENCOUNTER — HOSPITAL ENCOUNTER (OUTPATIENT)
Dept: RADIOLOGY | Age: 69
Discharge: HOME | End: 2019-11-15
Payer: COMMERCIAL

## 2019-11-15 DIAGNOSIS — Z82.49 FAMILY HISTORY OF ABDOMINAL AORTIC ANEURYSM: ICD-10-CM

## 2019-11-15 PROCEDURE — 76706 US ABDL AORTA SCREEN AAA: CPT

## 2020-10-09 ENCOUNTER — TELEPHONE (OUTPATIENT)
Dept: FAMILY MEDICINE CLINIC | Facility: CLINIC | Age: 70
End: 2020-10-09

## 2020-10-13 ENCOUNTER — CLINICAL SUPPORT (OUTPATIENT)
Dept: FAMILY MEDICINE CLINIC | Facility: CLINIC | Age: 70
End: 2020-10-13
Payer: COMMERCIAL

## 2020-10-13 VITALS — TEMPERATURE: 97.9 F

## 2020-10-13 DIAGNOSIS — Z23 ENCOUNTER FOR IMMUNIZATION: Primary | ICD-10-CM

## 2020-10-13 PROCEDURE — G0008 ADMIN INFLUENZA VIRUS VAC: HCPCS

## 2020-10-13 PROCEDURE — 90662 IIV NO PRSV INCREASED AG IM: CPT

## 2020-11-19 ENCOUNTER — OFFICE VISIT (OUTPATIENT)
Dept: FAMILY MEDICINE CLINIC | Facility: CLINIC | Age: 70
End: 2020-11-19
Payer: COMMERCIAL

## 2020-11-19 VITALS
HEIGHT: 62 IN | TEMPERATURE: 97.2 F | WEIGHT: 128.9 LBS | BODY MASS INDEX: 23.72 KG/M2 | DIASTOLIC BLOOD PRESSURE: 72 MMHG | OXYGEN SATURATION: 99 % | SYSTOLIC BLOOD PRESSURE: 132 MMHG | HEART RATE: 75 BPM

## 2020-11-19 DIAGNOSIS — Z86.010 HX OF COLONIC POLYPS: ICD-10-CM

## 2020-11-19 DIAGNOSIS — E78.5 HYPERLIPIDEMIA, UNSPECIFIED HYPERLIPIDEMIA TYPE: ICD-10-CM

## 2020-11-19 DIAGNOSIS — D50.9 IRON DEFICIENCY ANEMIA, UNSPECIFIED IRON DEFICIENCY ANEMIA TYPE: ICD-10-CM

## 2020-11-19 DIAGNOSIS — I48.92 ATRIAL FLUTTER, UNSPECIFIED TYPE (HCC): Primary | ICD-10-CM

## 2020-11-19 DIAGNOSIS — Z00.00 MEDICARE ANNUAL WELLNESS VISIT, SUBSEQUENT: Chronic | ICD-10-CM

## 2020-11-19 DIAGNOSIS — R92.8 ABNORMAL MAMMOGRAM: ICD-10-CM

## 2020-11-19 LAB
CHOLEST SERPL-MCNC: 271 MG/DL (ref 50–200)
HDLC SERPL-MCNC: 81 MG/DL
LDLC SERPL CALC-MCNC: 150 MG/DL (ref 0–100)
NONHDLC SERPL-MCNC: 190 MG/DL
TRIGL SERPL-MCNC: 198 MG/DL

## 2020-11-19 PROCEDURE — 80061 LIPID PANEL: CPT | Performed by: FAMILY MEDICINE

## 2020-11-19 PROCEDURE — G0439 PPPS, SUBSEQ VISIT: HCPCS | Performed by: FAMILY MEDICINE

## 2020-11-19 PROCEDURE — 99214 OFFICE O/P EST MOD 30 MIN: CPT | Performed by: FAMILY MEDICINE

## 2020-11-19 PROCEDURE — 36415 COLL VENOUS BLD VENIPUNCTURE: CPT | Performed by: FAMILY MEDICINE

## 2021-01-05 ENCOUNTER — TRANSCRIBE ORDERS (OUTPATIENT)
Dept: ADMINISTRATIVE | Facility: HOSPITAL | Age: 71
End: 2021-01-05

## 2021-01-05 DIAGNOSIS — Z12.31 ENCOUNTER FOR SCREENING MAMMOGRAM FOR MALIGNANT NEOPLASM OF BREAST: Primary | ICD-10-CM

## 2021-01-12 ENCOUNTER — HOSPITAL ENCOUNTER (OUTPATIENT)
Dept: RADIOLOGY | Age: 71
Discharge: HOME | End: 2021-01-12
Payer: COMMERCIAL

## 2021-01-12 VITALS — WEIGHT: 125 LBS | BODY MASS INDEX: 23 KG/M2 | HEIGHT: 62 IN

## 2021-01-12 DIAGNOSIS — Z12.31 ENCOUNTER FOR SCREENING MAMMOGRAM FOR MALIGNANT NEOPLASM OF BREAST: ICD-10-CM

## 2021-01-12 PROCEDURE — 77067 SCR MAMMO BI INCL CAD: CPT

## 2021-01-12 PROCEDURE — 77063 BREAST TOMOSYNTHESIS BI: CPT

## 2021-09-27 NOTE — PRE-PROCEDURE INSTRUCTIONS
Pre-Surgery Instructions:   Medication Instructions    metoprolol succinate (TOPROL-XL) 50 mg 24 hr tablet Instructed to take per normal schedule including DOS with sips water    metoprolol tartrate (LOPRESSOR) 25 mg tablet Instructed pt that may continue to use as prescribed as needed up to and including DOS    Multiple Vitamins-Minerals (MULTIVITAMIN ADULT PO) Instructed to HOLD 7 days prior to surgery - by surgeon instruction sheet - pt reports stopped on 9/26/21     Reviewed all medications and instructions for DOS  Reviewed all showering instructions and COVID visitation policy  Pt aware that [facility] is location for DOS, instructed that pt pre op nurse will call on 10/1/21 to give specific instructions for DOS  Pt instructed to bring photo ID and insurance card for DOS, remove all jewelry and  NO valuables for DOS  Pt instructed to use only Tylenol between now 9/27/21 and DOS, NO NSAID products  Pt informed transport is needed for DOS due to receiving anesthesia  Instructed patient to minimize alcohol use prior to surgery  No alcohol 24 hours prior to surgery to reduce risk of dehydration  Pt verbalized understanding of all instructions given and reviewed for DOS  My Surgical Experience    The following information was developed to assist you to prepare for your operation  What do I need to do before coming to the hospital?   Arrange for a responsible person to drive you to and from the hospital    Arrange care for your children at home  Children are not allowed in the recovery areas of the hospital   Plan to wear clothing that is easy to put on and take off  If you are having shoulder surgery, wear a shirt that buttons or zippers in the front  Bathing  o Shower the evening before and the morning of your surgery with an antibacterial soap   Please refer to the Pre Op Showering Instructions for Surgery Patients Sheet   o Remove nail polish and all body piercing jewelry  o Do not shave any body part for at least 24 hours before surgery-this includes face, arms, legs and upper body  Food  o Nothing to eat or drink after midnight the night before your surgery  This includes candy and chewing gum  o Exception: If your surgery is after 12:00pm (noon), you may have clear liquids such as 7-Up®, ginger ale, apple or cranberry juice, Jell-O®, water, or clear broth until 8:00 am  o Do not drink milk or juice with pulp on the morning before surgery  o Do not drink alcohol 24 hours before surgery  Medicine  o Follow instructions you received from your surgeon about which medicines you may take on the day of surgery  o If instructed to take medicine on the morning of surgery, take pills with just a small sip of water  Call your prescribing doctor for specific infroamtion on what to do if you take insulin    What should I bring to the hospital?    Bring:  Trish Mitchellel or a walker, if you have them, for foot or knee surgery   A list of the daily medicines, vitamins, minerals, herbals and nutritional supplements you take  Include the dosages of medicines and the time you take them each day   Glasses, dentures or hearing aids   Minimal clothing; you will be wearing hospital sleepwear   Photo ID; required to verify your identity   If you have a Living Will or Power of , bring a copy of the documents   If you have an ostomy, bring an extra pouch and any supplies you use    Do not bring   Medicines or inhalers   Money, valuables or jewelry    What other information should I know about the day of surgery?  Notify your surgeons if you develop a cold, sore throat, cough, fever, rash or any other illness     Report to the Ambulatory Surgical/Same Day Surgery Unit   You will be instructed to stop at Registration only if you have not been pre-registered   Inform your  fi they do not stay that they will be asked by the staff to leave a phone number where they can be reached   Be available to be reached before surgery  In the event the operating room schedule changes, you may be asked to come in earlier or later than expected    *It is important to tell your doctor and others involved in your health care if you are taking or have been taking any non-prescription drugs, vitamins, minerals, herbals or other nutritional supplements   Any of these may interact with some food or medicines and cause a reaction

## 2021-10-01 ENCOUNTER — ANESTHESIA EVENT (OUTPATIENT)
Dept: PERIOP | Facility: HOSPITAL | Age: 71
End: 2021-10-01
Payer: COMMERCIAL

## 2021-10-04 ENCOUNTER — HOSPITAL ENCOUNTER (OUTPATIENT)
Facility: HOSPITAL | Age: 71
Setting detail: OUTPATIENT SURGERY
Discharge: HOME | End: 2021-10-04
Attending: OBSTETRICS & GYNECOLOGY | Admitting: OBSTETRICS & GYNECOLOGY
Payer: COMMERCIAL

## 2021-10-04 ENCOUNTER — ANESTHESIA (OUTPATIENT)
Dept: PERIOP | Facility: HOSPITAL | Age: 71
End: 2021-10-04
Payer: COMMERCIAL

## 2021-10-04 VITALS
HEIGHT: 62 IN | TEMPERATURE: 97.4 F | DIASTOLIC BLOOD PRESSURE: 52 MMHG | RESPIRATION RATE: 14 BRPM | OXYGEN SATURATION: 95 % | HEART RATE: 66 BPM | SYSTOLIC BLOOD PRESSURE: 104 MMHG | WEIGHT: 121 LBS | BODY MASS INDEX: 22.26 KG/M2

## 2021-10-04 DIAGNOSIS — N81.11 CYSTOCELE, MIDLINE: ICD-10-CM

## 2021-10-04 DIAGNOSIS — N81.2 INCOMPLETE UTEROVAGINAL PROLAPSE: ICD-10-CM

## 2021-10-04 DIAGNOSIS — N39.3 STRESS INCONTINENCE: Primary | ICD-10-CM

## 2021-10-04 PROBLEM — N36.41 HYPERMOBILITY OF URETHRA: Status: ACTIVE | Noted: 2021-10-04

## 2021-10-04 PROBLEM — N81.84 PELVIC MUSCLE WASTING: Status: ACTIVE | Noted: 2021-10-04

## 2021-10-04 PROBLEM — I47.1 PAROXYSMAL SVT (SUPRAVENTRICULAR TACHYCARDIA) (HCC): Status: ACTIVE | Noted: 2021-10-04

## 2021-10-04 PROBLEM — I47.10 PAROXYSMAL SVT (SUPRAVENTRICULAR TACHYCARDIA): Status: ACTIVE | Noted: 2021-10-04

## 2021-10-04 PROCEDURE — C1889 IMPLANT/INSERT DEVICE, NOC: HCPCS | Performed by: OBSTETRICS & GYNECOLOGY

## 2021-10-04 DEVICE — FIXATION KIT POP ENPLACE (NEUGUIDE): Type: IMPLANTABLE DEVICE | Site: VAGINA | Status: FUNCTIONAL

## 2021-10-04 DEVICE — MID-URETHRAL SLING ASSEMBLY, FLAT NEEDLES
Type: IMPLANTABLE DEVICE | Site: VAGINA | Status: FUNCTIONAL
Brand: ATHENA SURGICAL RMUS SYSTEM

## 2021-10-04 RX ORDER — EPHEDRINE SULFATE 50 MG/ML
INJECTION INTRAVENOUS AS NEEDED
Status: DISCONTINUED | OUTPATIENT
Start: 2021-10-04 | End: 2021-10-04

## 2021-10-04 RX ORDER — ONDANSETRON 2 MG/ML
INJECTION INTRAMUSCULAR; INTRAVENOUS AS NEEDED
Status: DISCONTINUED | OUTPATIENT
Start: 2021-10-04 | End: 2021-10-04

## 2021-10-04 RX ORDER — ONDANSETRON 2 MG/ML
4 INJECTION INTRAMUSCULAR; INTRAVENOUS EVERY 6 HOURS PRN
Status: DISCONTINUED | OUTPATIENT
Start: 2021-10-04 | End: 2021-10-04 | Stop reason: HOSPADM

## 2021-10-04 RX ORDER — FUROSEMIDE 10 MG/ML
INJECTION INTRAMUSCULAR; INTRAVENOUS AS NEEDED
Status: DISCONTINUED | OUTPATIENT
Start: 2021-10-04 | End: 2021-10-04

## 2021-10-04 RX ORDER — FENTANYL CITRATE 50 UG/ML
25 INJECTION, SOLUTION INTRAMUSCULAR; INTRAVENOUS
Status: DISCONTINUED | OUTPATIENT
Start: 2021-10-04 | End: 2021-10-04 | Stop reason: HOSPADM

## 2021-10-04 RX ORDER — IBUPROFEN 600 MG/1
600 TABLET ORAL EVERY 6 HOURS PRN
Status: DISCONTINUED | OUTPATIENT
Start: 2021-10-04 | End: 2021-10-04 | Stop reason: HOSPADM

## 2021-10-04 RX ORDER — ACETAMINOPHEN 325 MG/1
975 TABLET ORAL EVERY 6 HOURS PRN
Status: DISCONTINUED | OUTPATIENT
Start: 2021-10-04 | End: 2021-10-04 | Stop reason: HOSPADM

## 2021-10-04 RX ORDER — SODIUM CHLORIDE 9 MG/ML
125 INJECTION, SOLUTION INTRAVENOUS CONTINUOUS
Status: DISCONTINUED | OUTPATIENT
Start: 2021-10-04 | End: 2021-10-04 | Stop reason: HOSPADM

## 2021-10-04 RX ORDER — MAGNESIUM HYDROXIDE 1200 MG/15ML
LIQUID ORAL AS NEEDED
Status: DISCONTINUED | OUTPATIENT
Start: 2021-10-04 | End: 2021-10-04 | Stop reason: HOSPADM

## 2021-10-04 RX ORDER — DOCUSATE SODIUM 100 MG/1
100 CAPSULE, LIQUID FILLED ORAL 2 TIMES DAILY PRN
Refills: 0
Start: 2021-10-04 | End: 2022-05-31 | Stop reason: ALTCHOICE

## 2021-10-04 RX ORDER — MIDAZOLAM HYDROCHLORIDE 2 MG/2ML
INJECTION, SOLUTION INTRAMUSCULAR; INTRAVENOUS AS NEEDED
Status: DISCONTINUED | OUTPATIENT
Start: 2021-10-04 | End: 2021-10-04

## 2021-10-04 RX ORDER — DEXAMETHASONE SODIUM PHOSPHATE 4 MG/ML
INJECTION, SOLUTION INTRA-ARTICULAR; INTRALESIONAL; INTRAMUSCULAR; INTRAVENOUS; SOFT TISSUE AS NEEDED
Status: DISCONTINUED | OUTPATIENT
Start: 2021-10-04 | End: 2021-10-04

## 2021-10-04 RX ORDER — FENTANYL CITRATE 50 UG/ML
INJECTION, SOLUTION INTRAMUSCULAR; INTRAVENOUS AS NEEDED
Status: DISCONTINUED | OUTPATIENT
Start: 2021-10-04 | End: 2021-10-04

## 2021-10-04 RX ORDER — IBUPROFEN 600 MG/1
600 TABLET ORAL EVERY 6 HOURS PRN
Qty: 30 TABLET | Refills: 0
Start: 2021-10-04 | End: 2021-11-23

## 2021-10-04 RX ORDER — CLINDAMYCIN PHOSPHATE 900 MG/50ML
900 INJECTION INTRAVENOUS ONCE
Status: COMPLETED | OUTPATIENT
Start: 2021-10-04 | End: 2021-10-04

## 2021-10-04 RX ORDER — LIDOCAINE HYDROCHLORIDE 20 MG/ML
INJECTION, SOLUTION INFILTRATION; PERINEURAL AS NEEDED
Status: DISCONTINUED | OUTPATIENT
Start: 2021-10-04 | End: 2021-10-04

## 2021-10-04 RX ORDER — SENNOSIDES 8.6 MG
650 CAPSULE ORAL EVERY 8 HOURS PRN
Qty: 30 TABLET | Refills: 0
Start: 2021-10-04

## 2021-10-04 RX ORDER — PROPOFOL 10 MG/ML
INJECTION, EMULSION INTRAVENOUS CONTINUOUS PRN
Status: DISCONTINUED | OUTPATIENT
Start: 2021-10-04 | End: 2021-10-04

## 2021-10-04 RX ADMIN — CLINDAMYCIN PHOSPHATE 900 MG: 900 INJECTION, SOLUTION INTRAVENOUS at 07:14

## 2021-10-04 RX ADMIN — FENTANYL CITRATE 25 MCG: 50 INJECTION INTRAMUSCULAR; INTRAVENOUS at 07:25

## 2021-10-04 RX ADMIN — GENTAMICIN SULFATE 250.5 MG: 40 INJECTION, SOLUTION INTRAMUSCULAR; INTRAVENOUS at 07:19

## 2021-10-04 RX ADMIN — MIDAZOLAM 2 MG: 1 INJECTION INTRAMUSCULAR; INTRAVENOUS at 07:15

## 2021-10-04 RX ADMIN — ONDANSETRON 4 MG: 2 INJECTION INTRAMUSCULAR; INTRAVENOUS at 07:40

## 2021-10-04 RX ADMIN — FENTANYL CITRATE 25 MCG: 50 INJECTION INTRAMUSCULAR; INTRAVENOUS at 07:42

## 2021-10-04 RX ADMIN — LIDOCAINE HYDROCHLORIDE 60 ML: 20 INJECTION, SOLUTION INFILTRATION; PERINEURAL at 07:22

## 2021-10-04 RX ADMIN — SODIUM CHLORIDE 125 ML/HR: 0.9 INJECTION, SOLUTION INTRAVENOUS at 06:25

## 2021-10-04 RX ADMIN — IBUPROFEN 600 MG: 600 TABLET ORAL at 11:38

## 2021-10-04 RX ADMIN — EPHEDRINE SULFATE 5 MG: 50 INJECTION, SOLUTION INTRAVENOUS at 07:58

## 2021-10-04 RX ADMIN — PROPOFOL 70 MCG/KG/MIN: 10 INJECTION, EMULSION INTRAVENOUS at 09:31

## 2021-10-04 RX ADMIN — SODIUM CHLORIDE 0.5 MCG/KG/HR: 9 INJECTION, SOLUTION INTRAVENOUS at 07:29

## 2021-10-04 RX ADMIN — PROPOFOL 50 MCG/KG/MIN: 10 INJECTION, EMULSION INTRAVENOUS at 07:22

## 2021-10-04 RX ADMIN — SODIUM CHLORIDE 125 ML/HR: 0.9 INJECTION, SOLUTION INTRAVENOUS at 10:31

## 2021-10-04 RX ADMIN — ACETAMINOPHEN 975 MG: 325 TABLET, FILM COATED ORAL at 14:40

## 2021-10-04 RX ADMIN — FENTANYL CITRATE 50 MCG: 50 INJECTION INTRAMUSCULAR; INTRAVENOUS at 07:36

## 2021-10-04 RX ADMIN — FUROSEMIDE 10 MG: 10 INJECTION, SOLUTION INTRAMUSCULAR; INTRAVENOUS at 09:20

## 2021-10-04 RX ADMIN — DEXAMETHASONE SODIUM PHOSPHATE 6 MG: 4 INJECTION INTRA-ARTICULAR; INTRALESIONAL; INTRAMUSCULAR; INTRAVENOUS; SOFT TISSUE at 07:40

## 2021-10-04 NOTE — OP NOTE
OPERATIVE REPORT  PATIENT NAME: Dylan Mcdermott    :  1950  MRN: [id number]  Pt Location: [facility] OR ROOM 04    SURGERY DATE: 10/4/2021    Surgeon(s) and Role: * Tj Mejia MD - Primary     * Mirian Lehman MD - Resident, first assisting    Preop Diagnosis:  Incomplete uterovaginal prolapse [N81 2]  Cystocele, midline [N81 11]  Pelvic muscle wasting [N81 84]  Hypermobility of urethra [N36 41]    Post-Op Diagnosis Codes:     * Incomplete uterovaginal prolapse [N81 2]     * Cystocele, midline [N81 11]     * Pelvic muscle wasting [N81 84]     * Hypermobility of urethra [N36 41]    Procedure(s) (LRB):  COLPOSUSPENSION VAGINAL EXTRAPERITONEAL(ENPLACE) (N/A)  ANTERIOR COLPORRHAPHY (N/A)  RETROPUBIC SLING (Antoinette)  CYSTOSCOPY (N/A)    Specimen(s):  * No specimens in log *    Estimated Blood Loss:   50ml    Drains:  * No LDAs found *    Anesthesia Type:   IV Sedation with Anesthesia    Operative Indications:  Incomplete uterovaginal prolapse [N81 2]  Cystocele, midline [N81 11]  Pelvic muscle wasting [N81 84]  Hypermobility of urethra [N36 41]      Operative Findings:  Incomplete uterovaginal prolapse  Midline cystocele    Complications:   None    Procedure and Technique:  Appropriate preoperative antibiotics chosen per ACOG guidelines were given  Bilateral SCDs were placed in the lower extremities for DVT prevention prior to the institution of anesthesia  The patient was identified in the holding area by the operating room staff and attending physician  She was taken to the operating room where anesthesia was instituted without complications  She was placed in the dorsal lithotomy position with the legs in [address] [place] [address] with care taken to avoid excessive flexion or extension of her lower extremities  The patient was prepped and draped in the usual sterile fashion  A Epperson catheter was inserted      A finger was placed in the lateral vagina, with careful palpation of the ischial spines and sacrospinous ligaments bilaterally  The right finger sleeve was then applied to the surgeon's index finger  These landmarks were then again palpated with the finger sleeve in place  Location along the sacrospinous ligament approximately in the middle 1/3 of the ligament as well as the lower 1/3 of the ligament was carefully palpated, and the trocar device loaded with the 0 Prolene suture was brought into the field  The trocar device was inserted into the finger sleeve port and the device was used to place the 0 Prolene suture at this location while the surgeons finger was firmly pressed against the ligament, approximately 2 cm medial to the ischial spine using the sacrum as a guide  Once the suture was anchored in place, which was confirmed by tenting of the ligament with gentle tugging, all instruments were removed from the vagina  She had the exact same procedure performed on the contralateral side  Next local was injected into the cervico-vaginal junction and a 3 cm anterior vaginal epithelial incision was made along the anterior cervico-vaginal junction, until the cervical stroma was encountered  Attention was turned to the anterior wall where a persistent cystocele was noted  Two Allis clamps were applied horizontally along the vaginal incision to grasp the dependent portion of the cystocele for traction  The epithelium was further  from the vaginal muscularis sharply with tenotomy and metzenbaum scissors and bluntly with peanut sponges and a raytec Excess vaginal mucosal skin was trimmed  The vaginal wall was then plicated in a vertical imbricating fashion using 2-0 Prolene  We then began closure of the anterior vaginal epithelial incision with a 2-0 Vicryl sutures in a running locked stitch from lateral to medial with two separate sutures  The incision was inspected and noted to be hemostatic  Next we started the retropubic sling procedure   We identified two areas on the anterior abdominal wall at the level of pubic bone 2 fingerbreadths lateral to the midline on the pubic bone  We put two skin marks and then we infiltrated with local anesthetic solution at that area and then underneath the pubic bone  We then turned our attention to the anterior vaginal wall  The mid urethral level was identified at the level of the Epperson catheter, and local anesthetic solution was injected into the anterior vaginal wall at the midurethral level for hydrodissection and vasoconstriction  Then, 10 mL were injected at the midline and an additional 7 mL were injected to the left and right of midline directing the infiltration laterally towards the cephalad aspect of the inferior pubic ramus bilaterally  Care was taken to ensure that the sulcus was flattened and free of infiltration to minimize the chance for erosion  After completion of hydrodissection, 2 Allis clamps were used to grasp the anterior vaginal wall for traction, and a 1 3-cm incision was made at the midline  Two Allis clamps were then placed on each cut edge of the incision for stabilization  Tenotomy scissors were used to create a small vaginal tunnel with sharp and blunt dissection in the anterior vaginal wall directing the dissection lateral to the midline, but going towards the pubic bone  Dissection was carried out to the edge of the pubic bone itself, and we did this bilaterally  Once the dissection was complete, the Epperson was drained  The rigid stylette was placed through the Epperson  This will be used for deflection of the bladder, and this was placed without complications  We deviated the bladder to the left side of the patient, and we passed an [place] sling via the introducer into the pre-dissected tract on the right side of the patient  Once we pierced the pubocervical fascia and passed the bone, we directed our trajectory of the trocar towards the anterior abdominal wall towards the premarked skin on the right side the patient   Once we were at the level of the skin, we incised the skin at the kristian with a scalpel, and the trocar was passed through that incision  At this time, this was secured with ring forceps leaving the trocar in place  We drained the bladder and removed the [place] and we performed a cystoscopy by instilling 200 mL of fluid inside the bladder  At this time, we saw the tract outside the bladder of the previously placed trocar and we did not see any lacerations or injuries to the bladder  We reinserted the [place] catheter to drain the bladder  We removed the introducer on the right side and pulled the blue plastic cover of the sling with a Isela clamp until the mesh came out the abdominal incision and secured it at the level of the mesh with the [employer]  We cut the blue plastic cover above the Isela clamp  We repeated the same sequence of steps for the placement of the left side  Cystoscopy was repeated and at this time we saw the tract outside the bladder of the previously placed trocar and we did not see any lacerations or injuries to the bladder  We used the cough test to elicit loss of fluid from the urethral meatus and there was loss of fluid noted  The tensioning suture was used to adjust the tape until there was minimal to no leakage with cough  After appropriate tensioning, the tensioning suture was cut and the vaginal incision was closed with 2-0 Vicryl suture in a running locked stitch  At this time, we placed the [place] back in the bladder  No bladder, ureteral, viscus, or solid organ injury were noted at the end of the procedure  Irrigation was performed  We completed the procedure  Th h[place] was removed  The suprapubic incision sites were closed with Exofin  Vaginal packing was placed in the vagina  There were no complications  The sponge, needle and instrument count were correct x2   The patient tolerated the procedure well and went to the recovery room in stable condition and she is stable at the moment of this dictation  Dr Kim Timmons was present for the entire procedure      Patient Disposition:  PACU     SIGNATURE: Rosi Eaton MD  DATE: October 4, 2021  TIME: 7:24 AM

## 2021-10-04 NOTE — INTERVAL H&P NOTE
H&P reviewed  The patient had no changes in the patients condition since the H&P had been written      Vitals:    10/04/21 0606   BP: 159/69   Pulse: 69   Resp: 16   Temp: 98 3 °F (36 8 °C)   SpO2: 97%     Jean Paul Rasheed MD  [facility] [address] [place] [address] Female Pelvic Medicine

## 2021-10-04 NOTE — DISCHARGE INSTRUCTIONS
Apical Vaginal Repair (EnPlace System)    WHAT YOU NEED TO KNOW:   An apical vaginal repair is a procedure to lift the cervix, vagina  and surrounding structures to the normal anatomical position  This can help prevent you from having a bulge sensation in the vagina  The procedure is also called an EnPlace procedure  PLEASE REMOVE VAGINAL PACKING FROM YOUR VAGINA ON Thursday  DISCHARGE INSTRUCTIONS:   Medicines:   · Pain medicine: You may need medicine to take away or decrease pain  ¨ Learn how to take your medicine  Ask what medicine and how much you should take  Be sure you know how, when, and how often to take it  ¨ Do not wait until the pain is severe before you take your medicine  Tell caregivers if your pain does not decrease  ¨ Pain medicine can make you dizzy or sleepy  Prevent falls by calling someone when you get out of bed or if you need help  · Antibiotics: This medicine is given to fight or prevent an infection caused by bacteria  Always take your antibiotics exactly as ordered by your healthcare provider  Do not stop taking your medicine unless directed by your healthcare provider  Never save antibiotics or take leftover antibiotics that were given to you for another illness  · Take your medicine as directed  Contact your healthcare provider if you think your medicine is not helping or if you have side effects  Tell him or her if you are allergic to any medicine  Keep a list of the medicines, vitamins, and herbs you take  Include the amounts, and when and why you take them  Bring the list or the pill bottles to follow-up visits  Carry your medicine list with you in case of an emergency  Follow up with your healthcare provider as directed:  Write down your questions so you remember to ask them during your visits  Self-care: · Vaginal packing: Vaginal packing was placed into the vagina  You will remove this packing on POD #3 early in the morning   (This is the 3rd day after your surgery)  The office will call to remind you to remove this packing  · Sex:  Do not have sex until your healthcare provider says it is okay  · Kegel exercises: To do kegel exercises, squeeze your pelvic floor muscles for 5 to 10 seconds, then release  Regular kegel exercises will help your pelvic floor muscles become stronger  This will help prevent you from leaking urine  Ask your healthcare provider when to start these exercises and how often to do them  · Sanitary pad:  Change your sanitary pad regularly  Keep track of how often you change the pad  · Epperson catheter:  Keep the bag below your waist  This will help prevent infection and other problems caused by urine flowing back into your bladder  Do not pull on the catheter because this can cause pain and bleeding, and the catheter could come out  Keep the catheter tubing free of kinks so your urine will flow into the bag  Your healthcare provider will remove the catheter as soon as possible, to help prevent infection  · Wound care:  When you are allowed to bathe or shower, carefully wash your vaginal area with soap and water  · Do not put pressure on your abdomen: This will help prevent damage to your surgery area  Do not strain, lift heavy objects, or stand for a very long time  Do not perform strenuous exercises, such as running and weight lifting  · Activity:  You may need to start walking within a few days after your procedure  Ask your healthcare provider when to start and how long you should walk  Ask about any other exercises that may be right for you  · Support socks: You may need to wear support socks  These are tight socks that help increase the circulation in your legs until you are more active  This helps prevent blood clots  Contact your healthcare provider if:   · You soak a sanitary pad with blood every hour for 4 hours      · You have vaginal pain that does not go away even after you take pain medicine  · You have pus or a foul-smelling discharge from your genital area  · You see blood in your urine  · You have pain during sex  · You have a fever, chills, a cough, or feel weak and achy  · You have nausea and vomiting  · You have questions or concerns about your condition or care  Seek care immediately or call 911 if:   · You feel something is bulging out into your vagina or rectum and not going back in     · You cannot urinate  · Your arm or leg feels warm, tender, and painful  It may look swollen and red  · You suddenly feel lightheaded and short of breath  · You have chest pain  You may have more pain when you take a deep breath or cough  You may cough up blood  © 2017 [address] [employer]  Information is for End User's use only and may not be sold, redistributed or otherwise used for commercial purposes  All illustrations and images included in CareNotes® are the copyrighted property of [employer] M [employer]  or [employer] Dor[employer]  The above information is an  only  It is not intended as medical advice for individual conditions or treatments  Talk to your doctor, nurse or pharmacist before following any medical regimen to see if it is safe and effective for you  Urethral Sling Procedure   WHAT YOU NEED TO KNOW:   A bladder sling procedure is surgery to treat urinary incontinence in women  The sling acts as a hammock to keep your urethra in place and hold it closed when your bladder is full  You may have vaginal bleeding or discharge for up to a week after your surgery  Use sanitary pads  Do not use tampons  You may have some pelvic discomfort or trouble urinating  DISCHARGE INSTRUCTIONS:   Call 911 for any of the following:   · You have sudden trouble breathing  Seek care immediately if:   · Your bleeding gets worse  · You have yellow or foul smelling discharge from your vagina      · You cannot urinate, or you are urinating less than what is normal for you  · You feel confused  Contact your healthcare provider if:   · You have a fever  · You do not feel like you are able to empty your bladder completely when you urinate  · You feel the need to urinate very suddenly  · You have burning or stinging when you urinate  · You have blood in your urine  · Your skin is itchy, swollen, or you have a rash  · You have questions or concerns about your condition or care  Medicines:   · Prescription pain medicine  may be given  Ask your how to take this medicine safely  · Take your medicine as directed  Contact your healthcare provider if you think your medicine is not helping or if you have side effects  Tell him or her if you are allergic to any medicine  Keep a list of the medicines, vitamins, and herbs you take  Include the amounts, and when and why you take them  Bring the list or the pill bottles to follow-up visits  Carry your medicine list with you in case of an emergency  Self-catheterization:  You may need to put a catheter into your bladder after you urinate to empty any remaining urine  A catheter is a small rubber tube used to drain urine  Healthcare providers will teach you how to put the catheter in safely  This may be needed until you are completely emptying your bladder  Epperson catheter: You may have a Epperson catheter for a short period of time  The Epperson is a tube put into your bladder to drain urine into a bag  Keep the bag below your waist  This will prevent urine from flowing back into your bladder and causing an infection or other problems  Also, keep the tube free of kinks so the urine will drain properly  Do not pull on the catheter  This can cause pain and bleeding, and may cause the catheter to come out  Activity:  Do not lift heavy objects for 6 weeks after your procedure  Do not have intercourse for 4 to 6 weeks  Do not use a tampon for 4 weeks   Ask your healthcare provider when you can return to work or your usual activities  Do pelvic muscle exercises: These are also called Kegel exercises  These exercises help strengthen your pelvic muscles and help prevent urine leakage  Tighten the muscles of your pelvis and hold them tight for 5 seconds, then relax for 5 seconds  Gradually work up to tightening them for 10 seconds and relaxing for 10 seconds  Do this 3 times each day  Keep a record:  Keep a record of when you urinate and if you leak any urine  Write down what you were doing when you leaked urine, such as coughing or sneezing  Bring the log to your follow-up visits  Prevent constipation:  Drink liquids as directed  You may need to drink more water than usual to soften your bowel movements  Eat a variety of healthy foods, especially fruit and foods high in fiber  You may need to use an over-the-counter bowel movement softener  Follow up with your healthcare provider as directed: You may need a test to check how much urine remains in your bladder after you urinate  This will help show how the sling is working  Write down your questions so you remember to ask them during your visits  © 2017 [facility] Information is for End User's use only and may not be sold, redistributed or otherwise used for commercial purposes  All illustrations and images included in CareNotes® are the copyrighted property of A D A M , Inc  or [employer] Dorado  The above information is an  only  It is not intended as medical advice for individual conditions or treatments  Talk to your doctor, nurse or pharmacist before following any medical regimen to see if it is safe and effective for you  Anterior Vaginal Repair   WHAT YOU NEED TO KNOW:   An anterior vaginal repair is a procedure to lift or tighten the front vaginal wall  This can help prevent you from leaking urine  The procedure is also called an anterior colporrhaphy  DISCHARGE INSTRUCTIONS:   Medicines:   · Pain medicine:     You may need medicine to take away or decrease pain  ¨ Learn how to take your medicine  Ask what medicine and how much you should take  Be sure you know how, when, and how often to take it  ¨ Do not wait until the pain is severe before you take your medicine  Tell caregivers if your pain does not decrease  ¨ Pain medicine can make you dizzy or sleepy  Prevent falls by calling someone when you get out of bed or if you need help  · Antibiotics: This medicine is given to fight or prevent an infection caused by bacteria  Always take your antibiotics exactly as ordered by your healthcare provider  Do not stop taking your medicine unless directed by your healthcare provider  Never save antibiotics or take leftover antibiotics that were given to you for another illness  · Take your medicine as directed  Contact your healthcare provider if you think your medicine is not helping or if you have side effects  Tell him or her if you are allergic to any medicine  Keep a list of the medicines, vitamins, and herbs you take  Include the amounts, and when and why you take them  Bring the list or the pill bottles to follow-up visits  Carry your medicine list with you in case of an emergency  Follow up with your healthcare provider as directed:  Write down your questions so you remember to ask them during your visits  Self-care:   · Sex:  Do not have sex until your healthcare provider says it is okay  · Kegel exercises: To do kegel exercises, squeeze your pelvic floor muscles for 5 to 10 seconds, then release  Regular kegel exercises will help your pelvic floor muscles become stronger  This will help prevent you from leaking urine  Ask your healthcare provider when to start these exercises and how often to do them  · Sanitary pad:  Change your sanitary pad regularly  Keep track of how often you change the pad      · Epperson catheter:  Keep the bag below your waist  This will help prevent infection and other problems caused by urine flowing back into your bladder  Do not pull on the catheter because this can cause pain and bleeding, and the catheter could come out  Keep the catheter tubing free of kinks so your urine will flow into the bag  Your healthcare provider will remove the catheter as soon as possible, to help prevent infection  · Wound care:  When you are allowed to bathe or shower, carefully wash your vaginal area with soap and water  · Do not put pressure on your abdomen: This will help prevent damage to your surgery area  Do not strain, lift heavy objects, or stand for a very long time  Do not perform strenuous exercises, such as running and weight lifting  · Activity:  You may need to start walking within a few days after your procedure  Ask your healthcare provider when to start and how long you should walk  Ask about any other exercises that may be right for you  · Support socks: You may need to wear support socks  These are tight socks that help increase the circulation in your legs until you are more active  This helps prevent blood clots  Contact your healthcare provider if:   · You soak a sanitary pad with blood every hour for 4 hours  · You have vaginal pain that does not go away even after you take pain medicine  · You have pus or a foul-smelling discharge from your genital area  · You see blood in your urine  · You have pain during sex  · You have a fever, chills, a cough, or feel weak and achy  · You have nausea and vomiting  · You have questions or concerns about your condition or care  Seek care immediately or call 911 if:   · You feel something is bulging out into your vagina or rectum and not going back in     · You cannot urinate  · Your arm or leg feels warm, tender, and painful  It may look swollen and red  · You suddenly feel lightheaded and short of breath  · You have chest pain  You may have more pain when you take a deep breath or cough  You may cough up blood  © 2017 [address] [facility] Information is for End User's use only and may not be sold, redistributed or otherwise used for commercial purposes  All illustrations and images included in CareNotes® are the copyrighted property of [employer] Inc  or [employer] Dorado  The above information is an  only  It is not intended as medical advice for individual conditions or treatments  Talk to your doctor, nurse or pharmacist before following any medical regimen to see if it is safe and effective for you  Posterior Vaginal Repair   WHAT YOU NEED TO KNOW:   A posterior vaginal repair is surgery to fix a rectocele or vaginal hernia  DISCHARGE INSTRUCTIONS:   Medicines:   · Pain medicine  will help take away or decrease pain  Do not wait until the pain is severe before you take your medicine  · NSAIDs , such as ibuprofen, help decrease swelling, pain, and fever  This medicine is available with or without a doctor's order  NSAIDs can cause stomach bleeding or kidney problems in certain people  If you take blood thinner medicine, always ask your healthcare provider if NSAIDs are safe for you  Always read the medicine label and follow directions  · Bowel movement softeners  make it easier for you to have a bowel movement  You may need this medicine to treat or prevent constipation  · Take your medicine as directed  Contact your healthcare provider if you think your medicine is not helping or if you have side effects  Tell him or her if you are allergic to any medicine  Keep a list of the medicines, vitamins, and herbs you take  Include the amounts, and when and why you take them  Bring the list or the pill bottles to follow-up visits  Carry your medicine list with you in case of an emergency  Follow up with your gynecologist in 2 weeks: You will need to return to have your incision checked   Write down your questions so you remember to ask them during your visits  Self-care:   · Do not have sex  until your healthcare provider says it is okay  · Do not put anything in your vagina  for 6 weeks after the surgery  This allows time for the wound to heal      · Do not lift more than 10 pounds  for at least 6 weeks  Heavy lifting puts pressure on the surgery area and slows healing  · Avoid heavy exercise  the first few weeks after the surgery  You may try light activity, such as short walks, 3 to 4 weeks after the surgery  · Try not to cough or strain to have a bowel movement  This may cause damage to the surgery area  Ask your healthcare provider about ways to make bowel movements easier so you do not have to strain  · Eat healthy foods and drink liquids as directed  This will help prevent constipation  Healthy foods include fruits, vegetables, whole-grain breads, low-fat dairy products, beans, lean meats, and fish  Contact your healthcare provider or gynecologist if:   · You have vaginal pain that does not go away, even after you take pain medicine  · You have pus or a foul-smelling discharge from your vagina  · You have pain during sex  · You have a fever or chills  · Your wound is red, swollen, or draining pus  · You have questions or concerns about your condition or care  Seek care immediately or call 911 if:   · You soak a sanitary pad with blood every hour for 4 hours  · You feel something is bulging out into your vagina or rectum and not going back in     · You cannot urinate  © 2017 [facility] Information is for End User's use only and may not be sold, redistributed or otherwise used for commercial purposes  All illustrations and images included in CareNotes® are the copyrighted property of [employer]  or [employer]sequiel Dorado  The above information is an  only  It is not intended as medical advice for individual conditions or treatments   Talk to your doctor, nurse or pharmacist before following any medical regimen to see if it is safe and effective for you

## 2021-11-23 ENCOUNTER — OFFICE VISIT (OUTPATIENT)
Dept: FAMILY MEDICINE CLINIC | Facility: CLINIC | Age: 71
End: 2021-11-23
Payer: COMMERCIAL

## 2021-11-23 VITALS
TEMPERATURE: 97.7 F | HEIGHT: 62 IN | HEART RATE: 68 BPM | RESPIRATION RATE: 18 BRPM | OXYGEN SATURATION: 98 % | WEIGHT: 121 LBS | SYSTOLIC BLOOD PRESSURE: 138 MMHG | BODY MASS INDEX: 22.26 KG/M2 | DIASTOLIC BLOOD PRESSURE: 80 MMHG

## 2021-11-23 DIAGNOSIS — Z82.49 FAMILY HISTORY OF ANEURYSM: ICD-10-CM

## 2021-11-23 DIAGNOSIS — I48.92 ATRIAL FLUTTER, UNSPECIFIED TYPE (HCC): ICD-10-CM

## 2021-11-23 DIAGNOSIS — E78.5 HYPERLIPIDEMIA, UNSPECIFIED HYPERLIPIDEMIA TYPE: ICD-10-CM

## 2021-11-23 DIAGNOSIS — Z00.00 MEDICARE ANNUAL WELLNESS VISIT, SUBSEQUENT: Chronic | ICD-10-CM

## 2021-11-23 DIAGNOSIS — R92.8 ABNORMAL MAMMOGRAM: ICD-10-CM

## 2021-11-23 DIAGNOSIS — D50.9 IRON DEFICIENCY ANEMIA, UNSPECIFIED IRON DEFICIENCY ANEMIA TYPE: ICD-10-CM

## 2021-11-23 DIAGNOSIS — E03.8 SUBCLINICAL HYPOTHYROIDISM: Chronic | ICD-10-CM

## 2021-11-23 DIAGNOSIS — I47.1 PAROXYSMAL SVT (SUPRAVENTRICULAR TACHYCARDIA) (HCC): Primary | ICD-10-CM

## 2021-11-23 DIAGNOSIS — Z86.010 HX OF COLONIC POLYPS: ICD-10-CM

## 2021-11-23 PROCEDURE — 90662 IIV NO PRSV INCREASED AG IM: CPT

## 2021-11-23 PROCEDURE — 99214 OFFICE O/P EST MOD 30 MIN: CPT | Performed by: FAMILY MEDICINE

## 2021-11-23 PROCEDURE — G0439 PPPS, SUBSEQ VISIT: HCPCS | Performed by: FAMILY MEDICINE

## 2021-11-23 PROCEDURE — G0008 ADMIN INFLUENZA VIRUS VAC: HCPCS

## 2021-11-23 RX ORDER — ROSUVASTATIN CALCIUM 5 MG/1
TABLET, COATED ORAL
COMMUNITY
Start: 2021-11-12

## 2021-11-23 RX ORDER — NITROFURANTOIN 25; 75 MG/1; MG/1
CAPSULE ORAL
COMMUNITY
Start: 2021-11-05 | End: 2021-11-23

## 2021-11-24 NOTE — PROGRESS NOTES
[facility] - PROCEDURE RESPONSE NOTE    Based on the query that was sent to you, please document below any procedures that were performed  Patient had retropubic midurethral sling placement ([facility]) for treatment of her stress urinary incontinence and borderline ISD  The patient also had a vaginal extraperitoneal colposuspension utilizing the enplace system  This procedure was completed vaginally  No laparoscopic technique was required

## 2022-01-18 ENCOUNTER — HOSPITAL ENCOUNTER (OUTPATIENT)
Dept: RADIOLOGY | Age: 72
Discharge: HOME | End: 2022-01-18
Payer: COMMERCIAL

## 2022-01-18 VITALS — HEIGHT: 62 IN | BODY MASS INDEX: 22.26 KG/M2 | WEIGHT: 121 LBS

## 2022-01-18 DIAGNOSIS — Z12.31 ENCOUNTER FOR SCREENING MAMMOGRAM FOR MALIGNANT NEOPLASM OF BREAST: ICD-10-CM

## 2022-01-18 PROCEDURE — 77067 SCR MAMMO BI INCL CAD: CPT

## 2022-01-18 PROCEDURE — 77063 BREAST TOMOSYNTHESIS BI: CPT

## 2022-05-12 ENCOUNTER — TELEPHONE (OUTPATIENT)
Dept: FAMILY MEDICINE CLINIC | Facility: CLINIC | Age: 72
End: 2022-05-12

## 2022-05-12 NOTE — TELEPHONE ENCOUNTER
She can use over the counter meds  But since she has symptoms she should have a virtual and she in the 5 day window and could take the medications we have now    But it would have to be today since we are getting close to day 5

## 2022-05-12 NOTE — TELEPHONE ENCOUNTER
Patient tested positive in home test  Patient started with symptoms on Sunday  She does not want an appointment but she would like to know if theres anything she can do for the congestion, diarrhea, cough and body aches   Please advise

## 2022-05-31 ENCOUNTER — OFFICE VISIT (OUTPATIENT)
Dept: FAMILY MEDICINE CLINIC | Facility: CLINIC | Age: 72
End: 2022-05-31
Payer: COMMERCIAL

## 2022-05-31 VITALS
TEMPERATURE: 97.9 F | RESPIRATION RATE: 18 BRPM | OXYGEN SATURATION: 99 % | HEART RATE: 71 BPM | DIASTOLIC BLOOD PRESSURE: 82 MMHG | SYSTOLIC BLOOD PRESSURE: 120 MMHG | BODY MASS INDEX: 22.57 KG/M2 | WEIGHT: 123.4 LBS

## 2022-05-31 DIAGNOSIS — E03.8 SUBCLINICAL HYPOTHYROIDISM: Chronic | ICD-10-CM

## 2022-05-31 DIAGNOSIS — E78.5 HYPERLIPIDEMIA, UNSPECIFIED HYPERLIPIDEMIA TYPE: ICD-10-CM

## 2022-05-31 DIAGNOSIS — I48.92 ATRIAL FLUTTER, UNSPECIFIED TYPE (HCC): ICD-10-CM

## 2022-05-31 DIAGNOSIS — N81.11 CYSTOCELE, MIDLINE: ICD-10-CM

## 2022-05-31 DIAGNOSIS — Z86.010 HX OF COLONIC POLYPS: Chronic | ICD-10-CM

## 2022-05-31 DIAGNOSIS — N36.41 HYPERMOBILITY OF URETHRA: ICD-10-CM

## 2022-05-31 DIAGNOSIS — U07.1 COVID-19: ICD-10-CM

## 2022-05-31 DIAGNOSIS — N39.3 STRESS INCONTINENCE: ICD-10-CM

## 2022-05-31 DIAGNOSIS — R92.8 ABNORMAL MAMMOGRAM: Primary | ICD-10-CM

## 2022-05-31 DIAGNOSIS — N81.2 INCOMPLETE UTEROVAGINAL PROLAPSE: ICD-10-CM

## 2022-05-31 PROCEDURE — 99214 OFFICE O/P EST MOD 30 MIN: CPT | Performed by: FAMILY MEDICINE

## 2022-05-31 RX ORDER — DILTIAZEM HYDROCHLORIDE 120 MG/1
120 CAPSULE, COATED, EXTENDED RELEASE ORAL DAILY
COMMUNITY
Start: 2022-04-18

## 2022-05-31 RX ORDER — DEXAMETHASONE 4 MG/1
4 TABLET ORAL 2 TIMES DAILY WITH MEALS
Qty: 14 TABLET | Refills: 0 | Status: SHIPPED | OUTPATIENT
Start: 2022-05-31

## 2022-05-31 RX ORDER — CLOBETASOL PROPIONATE 0.5 MG/G
AEROSOL, FOAM TOPICAL 2 TIMES DAILY
Qty: 50 G | Refills: 3 | Status: SHIPPED | OUTPATIENT
Start: 2022-05-31 | End: 2022-06-01

## 2022-05-31 NOTE — PROGRESS NOTES
Assessment/Plan:    No problem-specific Assessment & Plan notes found for this encounter  Diagnoses and all orders for this visit:    Abnormal mammogram    Atrial flutter, unspecified type ([address] )    Subclinical hypothyroidism    Incomplete uterovaginal prolapse    Hypermobility of urethra    Cystocele, midline    Hx of colonic polyps    Stress incontinence    Hyperlipidemia, unspecified hyperlipidemia type    Other orders  -     diltiazem (CARDIZEM CD) 120 mg 24 hr capsule; Take 120 mg by mouth daily          Subjective:      Patient ID: Gilles Sandoval is a 66 y o  female  PATIENT RETURNS FOR FOLLOW-UP OF CHRONIC MEDICAL CONDITIONS  ANY HOSPITAL VISITS, EMERGENCY VISITS AND OTHER PROVIDER VISITS SINCE LAST TIME WERE REVIEWED  MEDS WERE REVIEWED AND NO SIDE EFFECTS  NO NEW ISSUES  UNLESS NOTED BELOW  NO NEW MEDICAL PROVIDER REPORTED  THE CHRONIC DISEASES LISTED ABOVE ARE STABLE AND UNCHANGED/ THE PLAN OF CARE FOR THOSE WILL REMAIN UNCHANGED UNLESS NOTED BELOW  Recent [facility]  The following portions of the patient's history were reviewed and updated as appropriate: allergies, current medications, past family history, past medical history, past social history, past surgical history and problem list     Review of Systems   Constitutional: Negative for activity change and appetite change  HENT: Negative for voice change  Eyes: Negative for visual disturbance  Respiratory: Negative for chest tightness and shortness of breath  Cardiovascular: Negative for chest pain, palpitations and leg swelling  Gastrointestinal: Negative for abdominal pain, blood in stool, constipation and diarrhea  Genitourinary: Negative for dysuria, vaginal bleeding and vaginal discharge  Skin: Positive for rash  Neurological: Negative for dizziness  Psychiatric/Behavioral: Negative for dysphoric mood           Objective:  Vitals:    05/31/22 0959   BP: 120/82   BP Location: Left arm   Patient Position: Sitting   Cuff Size: Standard   Pulse: 71   Resp: 18   Temp: 97 9 °F (36 6 °C)   TempSrc: Temporal   SpO2: 99%   Weight: 56 kg (123 lb 6 4 oz)      Physical Exam  Constitutional:       Appearance: She is well-developed  HENT:      Head: Normocephalic and atraumatic  Eyes:      Conjunctiva/sclera: Conjunctivae normal    Neck:      Thyroid: No thyromegaly  Cardiovascular:      Rate and Rhythm: Normal rate and regular rhythm  Heart sounds: Normal heart sounds  No murmur heard  Pulmonary:      Effort: Pulmonary effort is normal  No respiratory distress  Breath sounds: Normal breath sounds  Abdominal:      General: Distension: clobetasol  Musculoskeletal:      Cervical back: Neck supple  Lymphadenopathy:      Cervical: No cervical adenopathy  Skin:     General: Skin is warm and dry  Psychiatric:         Behavior: Behavior normal            Patient's chronic problems that were reviewed today are stable  Recent hospital stays reviewed  Recent labs and imaging reviewed  Recent visits to other providers reviewed  Meds reviewed and no changes made  Appropriate labs and imaging were ordered  Preventive measures appropriate for age and sex were reviewed with patient  Immunizations were updated as appropriate  Recent Covid : mild and resolved

## 2022-06-01 ENCOUNTER — HOSPITAL ENCOUNTER (OUTPATIENT)
Dept: RADIOLOGY | Age: 72
Discharge: HOME | End: 2022-06-01
Payer: COMMERCIAL

## 2022-06-01 DIAGNOSIS — Z00.00 MEDICARE ANNUAL WELLNESS VISIT, SUBSEQUENT: Chronic | ICD-10-CM

## 2022-06-01 DIAGNOSIS — L25.5 RHUS DERMATITIS: Primary | ICD-10-CM

## 2022-06-01 PROCEDURE — 77080 DXA BONE DENSITY AXIAL: CPT

## 2022-06-01 RX ORDER — FLUOCINONIDE TOPICAL SOLUTION USP, 0.05% 0.5 MG/ML
SOLUTION TOPICAL 2 TIMES DAILY
Qty: 60 ML | Refills: 2 | Status: SHIPPED | OUTPATIENT
Start: 2022-06-01

## 2022-06-02 ENCOUNTER — TELEPHONE (OUTPATIENT)
Dept: FAMILY MEDICINE CLINIC | Facility: CLINIC | Age: 72
End: 2022-06-02

## 2022-06-02 NOTE — TELEPHONE ENCOUNTER
----- Message from Hortencia Gibbons MD sent at 6/1/2022  1:09 PM EDT -----  Call : mild bone loss on dexa scan : continue good weight bbearing exercise and should be taking caltrate D one bid or equivalent to prevent further loss and osteoporosis  ----- Message -----  From: Interface, Radiology Results In  Sent: 6/1/2022  11:25 AM EDT  To: Hortencia Gibbons MD

## 2022-07-14 LAB — TSH SERPL-ACNC: 3.94 MIU/L (ref 0.4–4.5)

## 2022-08-22 RX ORDER — DILTIAZEM HYDROCHLORIDE 120 MG/1
120 CAPSULE, EXTENDED RELEASE ORAL DAILY
COMMUNITY
Start: 2022-01-31 | End: 2022-08-23 | Stop reason: SDUPTHER

## 2022-08-23 ENCOUNTER — OFFICE VISIT (OUTPATIENT)
Dept: FAMILY MEDICINE CLINIC | Facility: CLINIC | Age: 72
End: 2022-08-23
Payer: COMMERCIAL

## 2022-08-23 VITALS
SYSTOLIC BLOOD PRESSURE: 118 MMHG | DIASTOLIC BLOOD PRESSURE: 64 MMHG | RESPIRATION RATE: 18 BRPM | OXYGEN SATURATION: 98 % | HEIGHT: 62 IN | BODY MASS INDEX: 23.15 KG/M2 | TEMPERATURE: 97.7 F | WEIGHT: 125.8 LBS | HEART RATE: 70 BPM

## 2022-08-23 DIAGNOSIS — N39.0 URINARY TRACT INFECTION WITHOUT HEMATURIA, SITE UNSPECIFIED: Primary | ICD-10-CM

## 2022-08-23 LAB
BACTERIA UR QL AUTO: ABNORMAL /HPF
BILIRUB UR QL STRIP: NEGATIVE
CLARITY UR: ABNORMAL
COLOR UR: ABNORMAL
GLUCOSE UR STRIP-MCNC: NEGATIVE MG/DL
HGB UR QL STRIP.AUTO: ABNORMAL
KETONES UR STRIP-MCNC: NEGATIVE MG/DL
LEUKOCYTE ESTERASE UR QL STRIP: ABNORMAL
MUCOUS THREADS UR QL AUTO: ABNORMAL
NITRITE UR QL STRIP: POSITIVE
NON-SQ EPI CELLS URNS QL MICRO: ABNORMAL /HPF
PH UR STRIP.AUTO: 6.5 [PH]
PROT UR STRIP-MCNC: ABNORMAL MG/DL
RBC #/AREA URNS AUTO: ABNORMAL /HPF
SL AMB  POCT GLUCOSE, UA: NEGATIVE
SL AMB LEUKOCYTE ESTERASE,UA: ABNORMAL
SL AMB POCT BILIRUBIN,UA: NEGATIVE
SL AMB POCT BLOOD,UA: ABNORMAL
SL AMB POCT CLARITY,UA: ABNORMAL
SL AMB POCT COLOR,UA: YELLOW
SL AMB POCT KETONES,UA: NEGATIVE
SL AMB POCT NITRITE,UA: POSITIVE
SL AMB POCT PH,UA: 6
SL AMB POCT SPECIFIC GRAVITY,UA: 1
SL AMB POCT URINE PROTEIN: 4
SL AMB POCT UROBILINOGEN: NEGATIVE
SP GR UR STRIP.AUTO: 1.01 (ref 1–1.03)
TRANS CELLS #/AREA URNS HPF: PRESENT /[HPF]
UROBILINOGEN UR STRIP-ACNC: <2 MG/DL
WBC #/AREA URNS AUTO: ABNORMAL /HPF
WBC CLUMPS # UR AUTO: PRESENT /UL

## 2022-08-23 PROCEDURE — 87186 SC STD MICRODIL/AGAR DIL: CPT | Performed by: NURSE PRACTITIONER

## 2022-08-23 PROCEDURE — 87086 URINE CULTURE/COLONY COUNT: CPT | Performed by: NURSE PRACTITIONER

## 2022-08-23 PROCEDURE — 87077 CULTURE AEROBIC IDENTIFY: CPT | Performed by: NURSE PRACTITIONER

## 2022-08-23 PROCEDURE — 81001 URINALYSIS AUTO W/SCOPE: CPT | Performed by: NURSE PRACTITIONER

## 2022-08-23 PROCEDURE — 99213 OFFICE O/P EST LOW 20 MIN: CPT | Performed by: NURSE PRACTITIONER

## 2022-08-23 PROCEDURE — 81002 URINALYSIS NONAUTO W/O SCOPE: CPT | Performed by: NURSE PRACTITIONER

## 2022-08-23 RX ORDER — NITROFURANTOIN 25; 75 MG/1; MG/1
100 CAPSULE ORAL 2 TIMES DAILY
Qty: 14 CAPSULE | Refills: 0 | Status: SHIPPED | OUTPATIENT
Start: 2022-08-23 | End: 2022-08-30

## 2022-08-23 NOTE — PROGRESS NOTES
Assessment/Plan:         Diagnoses and all orders for this visit:    Urinary tract infection without hematuria, site unspecified  -     POCT urine dip  -     UA/M w/rflx Culture, Routine  -     nitrofurantoin (MACROBID) 100 mg capsule; Take 1 capsule (100 mg total) by mouth 2 (two) times a day for 7 days          Subjective:      Patient ID: Sonya German is a 66 y o  female  HPI  Started on Sunday with urgency and dysuria  She has had no fevers chill or lack of appetite  Just not feeling quite herself at this time  Had reconstructive surgery last October   Had many UTIs post    Never had them before  Over a year since then  Inserts premarin 2 times a week  The following portions of the patient's history were reviewed and updated as appropriate: allergies, current medications, past family history, past medical history, past social history, past surgical history and problem list     Review of Systems   Constitutional: Negative for activity change, appetite change, chills, fatigue and fever  Gastrointestinal: Negative for abdominal pain, constipation and diarrhea  Genitourinary: Positive for dysuria, frequency and urgency  Objective:  Vitals:    08/23/22 0719   BP: 118/64   BP Location: Left arm   Patient Position: Sitting   Cuff Size: Standard   Pulse: 70   Resp: 18   Temp: 97 7 °F (36 5 °C)   TempSrc: Temporal   SpO2: 98%   Weight: 57 1 kg (125 lb 12 8 oz)   Height: 5' 1 5" (1 562 m)      Physical Exam  Abdominal:      General: Abdomen is flat  Bowel sounds are normal       Tenderness: There is abdominal tenderness in the suprapubic area  Skin:     General: Skin is warm  Neurological:      General: No focal deficit present  Mental Status: She is oriented to person, place, and time     Psychiatric:         Behavior: Behavior normal

## 2022-08-25 ENCOUNTER — TELEPHONE (OUTPATIENT)
Dept: FAMILY MEDICINE CLINIC | Facility: CLINIC | Age: 72
End: 2022-08-25

## 2022-08-25 DIAGNOSIS — N39.0 URINARY TRACT INFECTION WITHOUT HEMATURIA, SITE UNSPECIFIED: Primary | ICD-10-CM

## 2022-08-25 LAB
BACTERIA UR CULT: ABNORMAL
BACTERIA UR CULT: ABNORMAL

## 2022-08-25 RX ORDER — LEVOFLOXACIN 250 MG/1
250 TABLET ORAL DAILY
Qty: 7 TABLET | Refills: 0 | Status: SHIPPED | OUTPATIENT
Start: 2022-08-25 | End: 2022-09-01

## 2022-08-25 NOTE — TELEPHONE ENCOUNTER
Patient would like a call back asap in regards to her lab results and the new medication that was sent over to the pharmacy

## 2022-08-25 NOTE — RESULT ENCOUNTER NOTE
+ culture for both E Coli and Proteus  Macrobid good for ecoli but resistant for proteus     Stop med and will send in levofloxacin

## 2022-08-26 ENCOUNTER — TELEPHONE (OUTPATIENT)
Dept: FAMILY MEDICINE CLINIC | Facility: CLINIC | Age: 72
End: 2022-08-26

## 2022-08-26 NOTE — TELEPHONE ENCOUNTER
Patient called asking if it is ok for her to get the 2nd covid booster now that she is currently on Levofloxacin 250 mg until 9/1/22? Patient is scheduled for injection 8/29/22   Please advise

## 2022-11-07 ENCOUNTER — CLINICAL SUPPORT (OUTPATIENT)
Dept: FAMILY MEDICINE CLINIC | Facility: CLINIC | Age: 72
End: 2022-11-07

## 2022-11-07 DIAGNOSIS — Z23 ENCOUNTER FOR IMMUNIZATION: Primary | ICD-10-CM

## 2022-12-14 ENCOUNTER — OFFICE VISIT (OUTPATIENT)
Dept: FAMILY MEDICINE CLINIC | Facility: CLINIC | Age: 72
End: 2022-12-14

## 2022-12-14 VITALS
HEIGHT: 62 IN | OXYGEN SATURATION: 98 % | BODY MASS INDEX: 23.26 KG/M2 | SYSTOLIC BLOOD PRESSURE: 118 MMHG | DIASTOLIC BLOOD PRESSURE: 60 MMHG | RESPIRATION RATE: 16 BRPM | HEART RATE: 67 BPM | TEMPERATURE: 97.3 F | WEIGHT: 126.4 LBS

## 2022-12-14 DIAGNOSIS — I47.1 PAROXYSMAL SVT (SUPRAVENTRICULAR TACHYCARDIA) (HCC): ICD-10-CM

## 2022-12-14 DIAGNOSIS — D50.9 IRON DEFICIENCY ANEMIA, UNSPECIFIED IRON DEFICIENCY ANEMIA TYPE: ICD-10-CM

## 2022-12-14 DIAGNOSIS — R92.8 ABNORMAL MAMMOGRAM: ICD-10-CM

## 2022-12-14 DIAGNOSIS — I48.92 ATRIAL FLUTTER, UNSPECIFIED TYPE (HCC): ICD-10-CM

## 2022-12-14 DIAGNOSIS — Z86.010 HX OF COLONIC POLYPS: ICD-10-CM

## 2022-12-14 DIAGNOSIS — E03.8 SUBCLINICAL HYPOTHYROIDISM: Primary | Chronic | ICD-10-CM

## 2022-12-14 DIAGNOSIS — Z00.00 MEDICARE ANNUAL WELLNESS VISIT, SUBSEQUENT: Chronic | ICD-10-CM

## 2022-12-14 DIAGNOSIS — U07.1 COVID-19: ICD-10-CM

## 2022-12-14 NOTE — PATIENT INSTRUCTIONS
Medicare Preventive Visit Patient Instructions  Thank you for completing your Welcome to Medicare Visit or Medicare Annual Wellness Visit today  Your next wellness visit will be due in one year (12/15/2023)  The screening/preventive services that you may require over the next 5-10 years are detailed below  Some tests may not apply to you based off risk factors and/or age  Screening tests ordered at today's visit but not completed yet may show as past due  Also, please note that scanned in results may not display below  Preventive Screenings:  Service Recommendations Previous Testing/Comments   Colorectal Cancer Screening  * Colonoscopy    * Fecal Occult Blood Test (FOBT)/Fecal Immunochemical Test (FIT)  * Fecal DNA/Cologuard Test  * Flexible Sigmoidoscopy Age: 39-70 years old   Colonoscopy: every 10 years (may be performed more frequently if at higher risk)  OR  FOBT/FIT: every 1 year  OR  Cologuard: every 3 years  OR  Sigmoidoscopy: every 5 years  Screening may be recommended earlier than age 39 if at higher risk for colorectal cancer  Also, an individualized decision between you and your healthcare provider will decide whether screening between the ages of 74-80 would be appropriate  Colonoscopy: 03/13/2018  FOBT/FIT: Not on file  Cologuard: Not on file  Sigmoidoscopy: Not on file    Screening Current     Breast Cancer Screening Age: 36 years old  Frequency: every 1-2 years  Not required if history of left and right mastectomy Mammogram: 01/18/2022    Screening Current   Cervical Cancer Screening Between the ages of 21-29, pap smear recommended once every 3 years  Between the ages of 33-67, can perform pap smear with HPV co-testing every 5 years     Recommendations may differ for women with a history of total hysterectomy, cervical cancer, or abnormal pap smears in past  Pap Smear: Not on file    Screening Not Indicated   Hepatitis C Screening Once for adults born between 1945 and 1965  More frequently in patients at high risk for Hepatitis C Hep C Antibody: 12/26/2018    Screening Current   Diabetes Screening 1-2 times per year if you're at risk for diabetes or have pre-diabetes Fasting glucose: No results in last 5 years (No results in last 5 years)  A1C: 5 8 % (10/2/2018)      Cholesterol Screening Once every 5 years if you don't have a lipid disorder  May order more often based on risk factors  Lipid panel: 03/09/2022    Screening Not Indicated  History Lipid Disorder     Other Preventive Screenings Covered by Medicare:  1  Abdominal Aortic Aneurysm (AAA) Screening: covered once if your at risk  You're considered to be at risk if you have a family history of AAA  2  Lung Cancer Screening: covers low dose CT scan once per year if you meet all of the following conditions: (1) Age 50-69; (2) No signs or symptoms of lung cancer; (3) Current smoker or have quit smoking within the last 15 years; (4) You have a tobacco smoking history of at least 20 pack years (packs per day multiplied by number of years you smoked); (5) You get a written order from a healthcare provider  3  Glaucoma Screening: covered annually if you're considered high risk: (1) You have diabetes OR (2) Family history of glaucoma OR (3)  aged 48 and older OR (3)  American aged 72 and older  3  Osteoporosis Screening: covered every 2 years if you meet one of the following conditions: (1) You're estrogen deficient and at risk for osteoporosis based off medical history and other findings; (2) Have a vertebral abnormality; (3) On glucocorticoid therapy for more than 3 months; (4) Have primary hyperparathyroidism; (5) On osteoporosis medications and need to assess response to drug therapy  · Last bone density test (DXA Scan): 06/01/2022   5  HIV Screening: covered annually if you're between the age of 15-65  Also covered annually if you are younger than 13 and older than 72 with risk factors for HIV infection   For pregnant patients, it is covered up to 3 times per pregnancy  Immunizations:  Immunization Recommendations   Influenza Vaccine Annual influenza vaccination during flu season is recommended for all persons aged >= 6 months who do not have contraindications   Pneumococcal Vaccine   * Pneumococcal conjugate vaccine = PCV13 (Prevnar 13), PCV15 (Vaxneuvance), PCV20 (Prevnar 20)  * Pneumococcal polysaccharide vaccine = PPSV23 (Pneumovax) Adults 25-60 years old: 1-3 doses may be recommended based on certain risk factors  Adults 72 years old: 1-2 doses may be recommended based off what pneumonia vaccine you previously received   Hepatitis B Vaccine 3 dose series if at intermediate or high risk (ex: diabetes, end stage renal disease, liver disease)   Tetanus (Td) Vaccine - COST NOT COVERED BY MEDICARE PART B Following completion of primary series, a booster dose should be given every 10 years to maintain immunity against tetanus  Td may also be given as tetanus wound prophylaxis  Tdap Vaccine - COST NOT COVERED BY MEDICARE PART B Recommended at least once for all adults  For pregnant patients, recommended with each pregnancy  Shingles Vaccine (Shingrix) - COST NOT COVERED BY MEDICARE PART B  2 shot series recommended in those aged 48 and above     Health Maintenance Due:      Topic Date Due   • Breast Cancer Screening: Mammogram  01/18/2023   • Colorectal Cancer Screening  03/13/2023   • Hepatitis C Screening  Completed     Immunizations Due:      Topic Date Due   • Hepatitis B Vaccine (1 of 3 - 3-dose series) Never done   • COVID-19 Vaccine (2 - Pfizer series) 03/15/2021     Advance Directives   What are advance directives? Advance directives are legal documents that state your wishes and plans for medical care  These plans are made ahead of time in case you lose your ability to make decisions for yourself   Advance directives can apply to any medical decision, such as the treatments you want, and if you want to donate organs  What are the types of advance directives? There are many types of advance directives, and each state has rules about how to use them  You may choose a combination of any of the following:  · Living will: This is a written record of the treatment you want  You can also choose which treatments you do not want, which to limit, and which to stop at a certain time  This includes surgery, medicine, IV fluid, and tube feedings  · Durable power of  for healthcare [facility]): This is a written record that states who you want to make healthcare choices for you when you are unable to make them for yourself  This person, called a proxy, is usually a family member or a friend  You may choose more than 1 proxy  · Do not resuscitate (DNR) order:  A DNR order is used in case your heart stops beating or you stop breathing  It is a request not to have certain forms of treatment, such as CPR  A DNR order may be included in other types of advance directives  · Medical directive: This covers the care that you want if you are in a coma, near death, or unable to make decisions for yourself  You can list the treatments you want for each condition  Treatment may include pain medicine, surgery, blood transfusions, dialysis, IV or tube feedings, and a ventilator (breathing machine)  · Values history: This document has questions about your views, beliefs, and how you feel and think about life  This information can help others choose the care that you would choose  Why are advance directives important? An advance directive helps you control your care  Although spoken wishes may be used, it is better to have your wishes written down  Spoken wishes can be misunderstood, or not followed  Treatments may be given even if you do not want them  An advance directive may make it easier for your family to make difficult choices about your care         © Copyright [employer] 2018 Information is for End User's use only and may not be sold, redistributed or otherwise used for commercial purposes  All illustrations and images included in CareNotes® are the copyrighted property of [employer]  or [facility] Preventive Visit Patient Instructions  Thank you for completing your Welcome to Medicare Visit or Medicare Annual Wellness Visit today  Your next wellness visit will be due in one year (12/15/2023)  The screening/preventive services that you may require over the next 5-10 years are detailed below  Some tests may not apply to you based off risk factors and/or age  Screening tests ordered at today's visit but not completed yet may show as past due  Also, please note that scanned in results may not display below  Preventive Screenings:  Service Recommendations Previous Testing/Comments   Colorectal Cancer Screening  * Colonoscopy    * Fecal Occult Blood Test (FOBT)/Fecal Immunochemical Test (FIT)  * Fecal DNA/Cologuard Test  * Flexible Sigmoidoscopy Age: 39-70 years old   Colonoscopy: every 10 years (may be performed more frequently if at higher risk)  OR  FOBT/FIT: every 1 year  OR  Cologuard: every 3 years  OR  Sigmoidoscopy: every 5 years  Screening may be recommended earlier than age 39 if at higher risk for colorectal cancer  Also, an individualized decision between you and your healthcare provider will decide whether screening between the ages of 74-80 would be appropriate  Colonoscopy: 03/13/2018  FOBT/FIT: Not on file  Cologuard: Not on file  Sigmoidoscopy: Not on file    Screening Current  Screening Current     Breast Cancer Screening Age: 36 years old  Frequency: every 1-2 years  Not required if history of left and right mastectomy Mammogram: 01/18/2022    Screening Current  Screening Current   Cervical Cancer Screening Between the ages of 21-29, pap smear recommended once every 3 years  Between the ages of 33-67, can perform pap smear with HPV co-testing every 5 years     Recommendations may differ for women with a history of total hysterectomy, cervical cancer, or abnormal pap smears in past  Pap Smear: Not on file    Screening Not Indicated  Screening Not Indicated   Hepatitis C Screening Once for adults born between 1945 and 1965  More frequently in patients at high risk for Hepatitis C Hep C Antibody: 12/26/2018    Screening Current  Screening Current   Diabetes Screening 1-2 times per year if you're at risk for diabetes or have pre-diabetes Fasting glucose: No results in last 5 years (No results in last 5 years)  A1C: 5 8 % (10/2/2018)  Screening Current   Cholesterol Screening Once every 5 years if you don't have a lipid disorder  May order more often based on risk factors  Lipid panel: 03/09/2022    Screening Not Indicated  History Lipid Disorder  Screening Not Indicated  History Lipid Disorder     Other Preventive Screenings Covered by Medicare:  6  Abdominal Aortic Aneurysm (AAA) Screening: covered once if your at risk  You're considered to be at risk if you have a family history of AAA  7  Lung Cancer Screening: covers low dose CT scan once per year if you meet all of the following conditions: (1) Age 50-69; (2) No signs or symptoms of lung cancer; (3) Current smoker or have quit smoking within the last 15 years; (4) You have a tobacco smoking history of at least 20 pack years (packs per day multiplied by number of years you smoked); (5) You get a written order from a healthcare provider  8  Glaucoma Screening: covered annually if you're considered high risk: (1) You have diabetes OR (2) Family history of glaucoma OR (3)  aged 48 and older OR (3)  American aged 72 and older  5   Osteoporosis Screening: covered every 2 years if you meet one of the following conditions: (1) You're estrogen deficient and at risk for osteoporosis based off medical history and other findings; (2) Have a vertebral abnormality; (3) On glucocorticoid therapy for more than 3 months; (4) Have primary hyperparathyroidism; (5) On osteoporosis medications and need to assess response to drug therapy  · Last bone density test (DXA Scan): 06/01/2022   10  HIV Screening: covered annually if you're between the age of 15-65  Also covered annually if you are younger than 13 and older than 72 with risk factors for HIV infection  For pregnant patients, it is covered up to 3 times per pregnancy  Immunizations:  Immunization Recommendations   Influenza Vaccine Annual influenza vaccination during flu season is recommended for all persons aged >= 6 months who do not have contraindications   Pneumococcal Vaccine   * Pneumococcal conjugate vaccine = PCV13 (Prevnar 13), PCV15 (Vaxneuvance), PCV20 (Prevnar 20)  * Pneumococcal polysaccharide vaccine = PPSV23 (Pneumovax) Adults 25-60 years old: 1-3 doses may be recommended based on certain risk factors  Adults 72 years old: 1-2 doses may be recommended based off what pneumonia vaccine you previously received   Hepatitis B Vaccine 3 dose series if at intermediate or high risk (ex: diabetes, end stage renal disease, liver disease)   Tetanus (Td) Vaccine - COST NOT COVERED BY MEDICARE PART B Following completion of primary series, a booster dose should be given every 10 years to maintain immunity against tetanus  Td may also be given as tetanus wound prophylaxis  Tdap Vaccine - COST NOT COVERED BY MEDICARE PART B Recommended at least once for all adults  For pregnant patients, recommended with each pregnancy     Shingles Vaccine (Shingrix) - COST NOT COVERED BY MEDICARE PART B  2 shot series recommended in those aged 48 and above     Health Maintenance Due:      Topic Date Due   • Breast Cancer Screening: Mammogram  01/18/2023   • Colorectal Cancer Screening  03/13/2023   • Hepatitis C Screening  Completed     Immunizations Due:      Topic Date Due   • Hepatitis B Vaccine (1 of 3 - 3-dose series) Never done   • COVID-19 Vaccine (2 - Pfizer series) 03/15/2021     Advance Directives   What are advance directives? Advance directives are legal documents that state your wishes and plans for medical care  These plans are made ahead of time in case you lose your ability to make decisions for yourself  Advance directives can apply to any medical decision, such as the treatments you want, and if you want to donate organs  What are the types of advance directives? There are many types of advance directives, and each state has rules about how to use them  You may choose a combination of any of the following:  · Living will: This is a written record of the treatment you want  You can also choose which treatments you do not want, which to limit, and which to stop at a certain time  This includes surgery, medicine, IV fluid, and tube feedings  · Durable power of  for healthcare [facility]): This is a written record that states who you want to make healthcare choices for you when you are unable to make them for yourself  This person, called a proxy, is usually a family member or a friend  You may choose more than 1 proxy  · Do not resuscitate (DNR) order:  A DNR order is used in case your heart stops beating or you stop breathing  It is a request not to have certain forms of treatment, such as CPR  A DNR order may be included in other types of advance directives  · Medical directive: This covers the care that you want if you are in a coma, near death, or unable to make decisions for yourself  You can list the treatments you want for each condition  Treatment may include pain medicine, surgery, blood transfusions, dialysis, IV or tube feedings, and a ventilator (breathing machine)  · Values history: This document has questions about your views, beliefs, and how you feel and think about life  This information can help others choose the care that you would choose  Why are advance directives important? An advance directive helps you control your care   Although spoken wishes may be used, it is better to have your wishes written down  Spoken wishes can be misunderstood, or not followed  Treatments may be given even if you do not want them  An advance directive may make it easier for your family to make difficult choices about your care  © Copyright [facility] 2018 Information is for End User's use only and may not be sold, redistributed or otherwise used for commercial purposes   All illustrations and images included in CareNotes® are the copyrighted property of [employer]  or [address] [place] [address]

## 2022-12-14 NOTE — PROGRESS NOTES
Assessment and Plan:     Problem List Items Addressed This Visit    None       Preventive health issues were discussed with patient, and age appropriate screening tests were ordered as noted in patient's After Visit Summary  Personalized health advice and appropriate referrals for health education or preventive services given if needed, as noted in patient's After Visit Summary  History of Present Illness:     Patient presents for a Medicare Wellness Visit    HPI   Patient Care Team:  Michelle Cardenas MD as PCP - Sujatha Baez MD as PCP - [address] [place] [address] ([place])     Review of Systems:     Review of Systems     Problem List:     Patient Active Problem List   Diagnosis   • Atrial flutter ([facility] [address] )   • Hx of colonic polyps   • Hyperlipidemia   • Iron deficiency anemia   • Abnormal mammogram   • Family history of aneurysm   • Medicare annual wellness visit, subsequent   • Paroxysmal SVT (supraventricular tachycardia) ([facility] [address] )   • Incomplete uterovaginal prolapse   • Cystocele, midline   • Pelvic muscle wasting   • Hypermobility of urethra   • Subclinical hypothyroidism   • COVID-19      Past Medical and Surgical History:     Past Medical History:   Diagnosis Date   • Anemia    • Bulging of cervical intervertebral disc     Bulging disk neck     • Colon polyp 03/2009   • History of anemia     9/27/21 Pt reports hx of anemia - corrected through prescription iron medication    • History of narrow angle glaucoma 2012   • Irregular heart beat     Takes for arrthymia   • SVT (supraventricular tachycardia) (HCC)     Palpitations      Past Surgical History:   Procedure Laterality Date   • BREAST BIOPSY Right 2005    Stereo benign   • CARDIAC ELECTROPHYSIOLOGY STUDY AND ABLATION  in 2014   • COLONOSCOPY  03/25/2009   • HERNIA REPAIR      Inguinal hernia - right side   • POLYPECTOMY  03/25/2009   • SC CMBND ANTERPOST COLPORRAPHY W/CYSTO N/A 10/4/2021    Procedure: Anterior COLPORRHAPHY;  Surgeon: Nicolas Guzman MD; [facility];  Service: UroGynecology          • WA CYSTOURETHROSCOPY N/A 10/4/2021    Procedure: CYSTOSCOPY;  Surgeon: Sreekanth Esteban MD;  [facility];  Service: UroGynecology          • WA REVAGINAL PROLAPSE,SACROSP LIG N/A 10/4/2021    Procedure: COLPOSUSPENSION VAGINAL EXTRAPERITONEAL(ENPLACE);   Surgeon: Sreekanth Esteban MD;  [facility];  Service: UroGynecology          • WA SLING OPER STRES INCONTINENCE N/A 10/4/2021    Procedure: PUBOVAGINAL SLING;  Surgeon: Sreekanth Esteban MD;  [facility];  Service: UroGynecology          • REFRACTIVE SURGERY        Family History:     Family History   Problem Relation Age of Onset   • Cervical polyp Father    • Prostate cancer Father    • Colon cancer Father    • Prostate cancer Brother    • Kidney cancer Brother    • Cancer Family         Colorectal and polyps   • Colon cancer Sister 61   • Colon cancer Paternal Grandmother 64   • Ovarian cancer Cousin 48   • No Known Problems Mother    • No Known Problems Daughter    • No Known Problems Sister    • No Known Problems Daughter    • Leukemia Maternal Aunt    • Lung cancer Maternal Aunt    • Brain cancer Maternal Aunt    • No Known Problems Maternal Aunt    • No Known Problems Paternal Aunt    • No Known Problems Paternal Aunt    • No Known Problems Paternal Aunt       Social History:     Social History     Socioeconomic History   • Marital status: /Civil Union     Spouse name: Not on file   • Number of children: Not on file   • Years of education: Not on file   • Highest education level: Not on file   Occupational History   • Not on file   Tobacco Use   • Smoking status: Never   • Smokeless tobacco: Never   Vaping Use   • Vaping Use: Never used   Substance and Sexual Activity   • Alcohol use: Yes     Comment: occasional- "every few months"   • Drug use: No     Comment: Denies   • Sexual activity: Yes     Comment: Denies any chest pain or shortness of breath with activity    Other Topics Concern   • Not on file   Social History Narrative   • Not on file     Social Determinants of Health     Financial Resource Strain: Not on file   Food Insecurity: Not on file   Transportation Needs: Not on file   Physical Activity: Not on file   Stress: Not on file   Social Connections: Not on file   Intimate Partner Violence: Not on file   Housing Stability: Not on file      Medications and Allergies:     Current Outpatient Medications   Medication Sig Dispense Refill   • acetaminophen (TYLENOL) 650 mg CR tablet Take 1 tablet (650 mg total) by mouth every 8 (eight) hours as needed for mild pain or moderate pain 30 tablet 0   • diltiazem (CARDIZEM CD) 120 mg 24 hr capsule Take 120 mg by mouth daily     • metoprolol succinate (TOPROL-XL) 50 mg 24 hr tablet Take 1 tablet by mouth daily One in a m      • metoprolol tartrate (LOPRESSOR) 25 mg tablet Take 1 tablet by mouth daily as needed 9/27/21 Pt reports is only using this for sustained palpitations - only prn  • Multiple Vitamins-Minerals (MULTIVITAMIN ADULT PO) Take 1 tablet by mouth daily 9/26/21 Pt instructed by general instruction sheet from Dr Fab Perez office  • rosuvastatin (CRESTOR) 5 mg tablet One weekly       No current facility-administered medications for this visit  Allergies   Allergen Reactions   • Codeine Nausea Only, Other (See Comments) and GI Intolerance     Other reaction(s): GI Intolerance  N/V  Other reaction(s): Nausea & vomiting  Other reaction(s):  Other (See Comments)  Other reaction(s): GI Intolerance  N/V  Other reaction(s): Free Text, GI Intolerance, Nausea & vomiting  Other reaction(s): Nausea and Vomiting   • Amoxicillin Hives and Swelling     Facial swelling   • Latex Itching and Rash     9/27/21 Pt reports latex within tape - leaves rash on skin    • Sulfa Antibiotics Swelling and Facial Swelling     Other reaction(s): Rash   • Sulfamethoxazole      Other reaction(s): Rash   • Sulfamethoxazole-Trimethoprim Other (See Comments)     facial swelling  Other reaction(s): Other (See Comments)  facial swelling   • Trimethoprim Rash   • Other Itching      Immunizations:     Immunization History   Administered Date(s) Administered   • COVID-19 PFIZER VACCINE 0 3 ML IM 02/22/2021   • INFLUENZA 09/28/2015, 09/22/2016, 09/26/2017   • Influenza Split 01/16/2014   • Influenza Split High Dose Preservative Free IM 09/22/2016, 09/26/2017   • Influenza, high dose seasonal 0 7 mL 10/02/2018, 11/14/2019, 10/13/2020, 11/23/2021, 11/07/2022   • Influenza, seasonal, injectable 11/13/2012, 10/27/2014, 09/28/2015   • Pneumococcal Conjugate 13-Valent 09/28/2015   • Pneumococcal Polysaccharide PPV23 10/27/2014   • Tdap 12/31/2015      Health Maintenance:         Topic Date Due   • Breast Cancer Screening: Mammogram  01/18/2023   • Colorectal Cancer Screening  03/13/2023   • Hepatitis C Screening  Completed         Topic Date Due   • Hepatitis B Vaccine (1 of 3 - 3-dose series) Never done   • COVID-19 Vaccine (2 - Pfizer series) 03/15/2021      Medicare Screening Tests and Risk Assessments: Sergio Bloch is here for her Subsequent Wellness visit  Health Risk Assessment:   Patient rates overall health as good  Patient feels that their physical health rating is same  Patient is satisfied with their life  Eyesight was rated as same  Hearing was rated as same  Patient feels that their emotional and mental health rating is same  Patients states they are never, rarely angry  Patient states they are sometimes unusually tired/fatigued  Pain experienced in the last 7 days has been none  Patient states that she has experienced no weight loss or gain in last 6 months  Fall Risk Screening: In the past year, patient has experienced: no history of falling in past year      Urinary Incontinence Screening:   Patient has not leaked urine accidently in the last six months  Home Safety:  Patient does not have trouble with stairs inside or outside of their home  Patient has working smoke alarms and has working carbon monoxide detector  Home safety hazards include: none  Nutrition:   Current diet is Regular  Medications:   Patient is currently taking over-the-counter supplements  OTC medications include: see medication list  Patient is able to manage medications  Activities of Daily Living (ADLs)/Instrumental Activities of Daily Living (IADLs):   Walk and transfer into and out of bed and chair?: Yes  Dress and groom yourself?: Yes    Bathe or shower yourself?: Yes    Feed yourself? Yes  Do your laundry/housekeeping?: Yes  Manage your money, pay your bills and track your expenses?: Yes  Make your own meals?: Yes    Do your own shopping?: Yes    Previous Hospitalizations:   Any hospitalizations or ED visits within the last 12 months?: No      Advance Care Planning:   Living will: Yes    Advanced directive: Yes      PREVENTIVE SCREENINGS      Cardiovascular Screening:    General: Screening Not Indicated and History Lipid Disorder      Colorectal Cancer Screening:     General: Screening Current      Breast Cancer Screening:     General: Screening Current      Cervical Cancer Screening:    General: Screening Not Indicated      Lung Cancer Screening:     General: Screening Not Indicated      Hepatitis C Screening:    General: Screening Current    Screening, Brief Intervention, and Referral to Treatment (SBIRT)    Screening      Single Item Drug Screening:  How often have you used an illegal drug (including marijuana) or a prescription medication for non-medical reasons in the past year? never    Single Item Drug Screen Score: 0  Interpretation: Negative screen for possible drug use disorder    No results found  Physical Exam:     There were no vitals taken for this visit      Physical Exam     Quirino Mason MD

## 2022-12-14 NOTE — PROGRESS NOTES
Assessment/Plan:    No problem-specific Assessment & Plan notes found for this encounter  Diagnoses and all orders for this visit:    Subclinical hypothyroidism    Atrial flutter, unspecified type ([address] )    Abnormal mammogram    COVID-19    Hx of colonic polyps    Medicare annual wellness visit, subsequent    Iron deficiency anemia, unspecified iron deficiency anemia type    Paroxysmal SVT (supraventricular tachycardia) (HCC)          Subjective:      Patient ID: Ian Abad is a 74 y o  female  PATIENT RETURNS FOR FOLLOW-UP OF CHRONIC MEDICAL CONDITIONS  ANY HOSPITAL VISITS, EMERGENCY VISITS AND OTHER PROVIDER VISITS SINCE LAST TIME WERE REVIEWED  MEDS WERE REVIEWED AND NO SIDE EFFECTS  NO NEW ISSUES  UNLESS NOTED BELOW  NO NEW MEDICAL PROVIDER REPORTED  THE CHRONIC DISEASES LISTED ABOVE ARE STABLE AND UNCHANGED/ THE PLAN OF CARE FOR THOSE WILL REMAIN UNCHANGED UNLESS NOTED BELOW  AWV/sub     Recent ER visit : SVT/ meds increased  ETT on 12/23  The following portions of the patient's history were reviewed and updated as appropriate: allergies, current medications, past family history, past medical history, past social history, past surgical history and problem list     Review of Systems   Constitutional: Negative for activity change and appetite change  HENT: Negative for voice change  Eyes: Negative for visual disturbance  Respiratory: Negative for chest tightness and shortness of breath  Cardiovascular: Positive for palpitations  Negative for chest pain and leg swelling  Gastrointestinal: Negative for abdominal pain, blood in stool, constipation and diarrhea  Genitourinary: Negative for dysuria, vaginal bleeding and vaginal discharge  Skin: Negative for rash  Neurological: Negative for dizziness  Psychiatric/Behavioral: Negative for dysphoric mood           Objective:  Vitals:    12/14/22 1037   BP: 118/60   BP Location: Left arm   Patient Position: Sitting   Cuff Size: Adult   Pulse: 67   Resp: 16   Temp: (!) 97 3 °F (36 3 °C)   SpO2: 98%   Weight: 57 3 kg (126 lb 6 4 oz)   Height: 5' 1 5" (1 562 m)      Physical Exam  Constitutional:       Appearance: She is well-developed  HENT:      Head: Normocephalic and atraumatic  Eyes:      Conjunctiva/sclera: Conjunctivae normal    Neck:      Thyroid: No thyromegaly  Cardiovascular:      Rate and Rhythm: Normal rate and regular rhythm  Heart sounds: Normal heart sounds  No murmur heard  Pulmonary:      Effort: Pulmonary effort is normal  No respiratory distress  Breath sounds: Normal breath sounds  Musculoskeletal:      Cervical back: Neck supple  Lymphadenopathy:      Cervical: No cervical adenopathy  Skin:     General: Skin is warm and dry  Psychiatric:         Behavior: Behavior normal            Patient's chronic problems that were reviewed today are stable  Recent hospital stays reviewed  Recent labs and imaging reviewed  Recent visits to other providers reviewed  Meds reviewed and no changes made  Appropriate labs and imaging were ordered  Preventive measures appropriate for age and sex were reviewed with patient  Immunizations were updated as appropriate

## 2022-12-14 NOTE — PROGRESS NOTES
Assessment and Plan:     Problem List Items Addressed This Visit        Endocrine    Subclinical hypothyroidism - Primary (Chronic)       Cardiovascular and Mediastinum    Paroxysmal SVT (supraventricular tachycardia) (HCC)    RESOLVED: Atrial flutter ([address] )       Other    Medicare annual wellness visit, subsequent (Chronic)    Hx of colonic polyps    Iron deficiency anemia    Abnormal mammogram    COVID-19        Preventive health issues were discussed with patient, and age appropriate screening tests were ordered as noted in patient's After Visit Summary  Personalized health advice and appropriate referrals for health education or preventive services given if needed, as noted in patient's After Visit Summary  History of Present Illness:     Patient presents for a Medicare Wellness Visit    HPI   Patient Care Team:  Frances Chand MD as PCP - Silvio Sin MD as PCP - [address] [place], [place] [address]Th [facility] ([facility])     Review of Systems:     Review of Systems     Problem List:     Patient Active Problem List   Diagnosis   • Hx of colonic polyps   • Hyperlipidemia   • Iron deficiency anemia   • Abnormal mammogram   • Family history of aneurysm   • Medicare annual wellness visit, subsequent   • Paroxysmal SVT (supraventricular tachycardia) ([address] )   • Incomplete uterovaginal prolapse   • Cystocele, midline   • Pelvic muscle wasting   • Hypermobility of urethra   • Subclinical hypothyroidism   • COVID-19      Past Medical and Surgical History:     Past Medical History:   Diagnosis Date   • Anemia    • Bulging of cervical intervertebral disc     Bulging disk neck     • Colon polyp 03/2009   • History of anemia     9/27/21 Pt reports hx of anemia - corrected through prescription iron medication    • History of narrow angle glaucoma 2012   • Irregular heart beat     Takes for arrthymia   • SVT (supraventricular tachycardia) (HCC)     Palpitations      Past Surgical History:   Procedure Laterality Date   • BREAST BIOPSY Right 2005    Stereo benign   • CARDIAC ELECTROPHYSIOLOGY STUDY AND ABLATION  in 2014   • COLONOSCOPY  03/25/2009   • HERNIA REPAIR      Inguinal hernia - right side   • POLYPECTOMY  03/25/2009   • RI CMBND ANTERPOST COLPORRAPHY W/CYSTO N/A 10/4/2021    Procedure: Anterior COLPORRHAPHY;  Surgeon: Danae Newsome MD;  [facility];  Service: UroGynecology          • RI CYSTOURETHROSCOPY N/A 10/4/2021    Procedure: [place];  Surgeon: Danae Newsome MD;  [facility];  Service: UroGynecology          • RI REVAGINAL PROLAPSE,SACROSP LIG N/A 10/4/2021    Procedure: COLPOSUSPENSION VAGINAL EXTRAPERITONEAL(ENPLACE);   Surgeon: Danae Newsome MD;  [facility];  Service: UroGynecology          • RI SLING OPER STRES INCONTINENCE N/A 10/4/2021    Procedure: PUBOVAGINAL SLING;  Surgeon: Danae Newsome MD;  [facility];  Service: UroGynecology          • REFRACTIVE SURGERY        Family History:     Family History   Problem Relation Age of Onset   • Cervical polyp Father    • Prostate cancer Father    • Colon cancer Father    • Prostate cancer Brother    • Kidney cancer Brother    • Cancer Family         Colorectal and polyps   • Colon cancer Sister 61   • Colon cancer Paternal Grandmother 64   • Ovarian cancer Cousin 48   • No Known Problems Mother    • No Known Problems Daughter    • No Known Problems Sister    • No Known Problems Daughter    • Leukemia Maternal Aunt    • Lung cancer Maternal Aunt    • Brain cancer Maternal Aunt    • No Known Problems Maternal Aunt    • No Known Problems Paternal Aunt    • No Known Problems Paternal Aunt    • No Known Problems Paternal Aunt       Social History:     Social History     Socioeconomic History   • Marital status: /Civil Union     Spouse name: None   • Number of children: None   • Years of education: None   • Highest education level: None   Occupational History   • None   Tobacco Use   • Smoking status: Never   • Smokeless tobacco: Never Vaping Use   • Vaping Use: Never used   Substance and Sexual Activity   • Alcohol use: Yes     Comment: occasional- "every few months"   • Drug use: No     Comment: Denies   • Sexual activity: Yes     Comment: Denies any chest pain or shortness of breath with activity    Other Topics Concern   • None   Social History Narrative   • None     Social Determinants of Health     Financial Resource Strain: Low Risk    • Difficulty of Paying Living Expenses: Not hard at all   Food Insecurity: Not on file   Transportation Needs: No Transportation Needs   • Lack of Transportation (Medical): No   • Lack of Transportation (Non-Medical): No   Physical Activity: Not on file   Stress: Not on file   Social Connections: Not on file   Intimate Partner Violence: Not on file   Housing Stability: Not on file      Medications and Allergies:     Current Outpatient Medications   Medication Sig Dispense Refill   • acetaminophen (TYLENOL) 650 mg CR tablet Take 1 tablet (650 mg total) by mouth every 8 (eight) hours as needed for mild pain or moderate pain 30 tablet 0   • diltiazem (CARDIZEM CD) 120 mg 24 hr capsule Take 120 mg by mouth daily     • metoprolol succinate (TOPROL-XL) 50 mg 24 hr tablet Take 1 tablet by mouth daily One in a m      • metoprolol tartrate (LOPRESSOR) 25 mg tablet Take 1 tablet by mouth daily as needed 9/27/21 Pt reports is only using this for sustained palpitations - only prn  • Multiple Vitamins-Minerals (MULTIVITAMIN ADULT PO) Take 1 tablet by mouth daily 9/26/21 Pt instructed by general instruction sheet from Dr Neo Pope office  • rosuvastatin (CRESTOR) 5 mg tablet One weekly       No current facility-administered medications for this visit  Allergies   Allergen Reactions   • Codeine Nausea Only, Other (See Comments) and GI Intolerance     Other reaction(s): GI Intolerance  N/V  Other reaction(s): Nausea & vomiting  Other reaction(s):  Other (See Comments)  Other reaction(s): GI Intolerance  N/V  Other reaction(s): Free Text, GI Intolerance, Nausea & vomiting  Other reaction(s): Nausea and Vomiting   • Amoxicillin Hives and Swelling     Facial swelling   • Latex Itching and Rash     9/27/21 Pt reports latex within tape - leaves rash on skin    • Sulfa Antibiotics Swelling and Facial Swelling     Other reaction(s): Rash   • Sulfamethoxazole      Other reaction(s): Rash   • Sulfamethoxazole-Trimethoprim Other (See Comments)     facial swelling  Other reaction(s): Other (See Comments)  facial swelling   • Trimethoprim Rash   • Other Itching      Immunizations:     Immunization History   Administered Date(s) Administered   • COVID-19 PFIZER VACCINE 0 3 ML IM 02/22/2021   • INFLUENZA 09/28/2015, 09/22/2016, 09/26/2017   • Influenza Split 01/16/2014   • Influenza Split High Dose Preservative Free IM 09/22/2016, 09/26/2017   • Influenza, high dose seasonal 0 7 mL 10/02/2018, 11/14/2019, 10/13/2020, 11/23/2021, 11/07/2022   • Influenza, seasonal, injectable 11/13/2012, 10/27/2014, 09/28/2015   • Pneumococcal Conjugate 13-Valent 09/28/2015   • Pneumococcal Polysaccharide PPV23 10/27/2014   • Tdap 12/31/2015      Health Maintenance:         Topic Date Due   • Breast Cancer Screening: Mammogram  01/18/2023   • Colorectal Cancer Screening  03/13/2023   • Hepatitis C Screening  Completed         Topic Date Due   • Hepatitis B Vaccine (1 of 3 - 3-dose series) Never done   • COVID-19 Vaccine (2 - Pfizer series) 03/15/2021      Medicare Screening Tests and Risk Assessments: Jennifer Jaramillo is here for her Subsequent Wellness visit  Last Medicare Wellness visit information reviewed, patient interviewed, no change since last AWV  Depression Screening:   PHQ-2 Score: 0      Previous Hospitalizations:   Any hospitalizations or ED visits within the last 12 months?: Yes      Hospitalization Comments: As noted     Eye and dental OK     Advance Care Planning:   Living will: Yes    Durable POA for healthcare:  Yes Advanced directive: Yes      Cognitive Screening:   Provider or family/friend/caregiver concerned regarding cognition?: No    PREVENTIVE SCREENINGS      Cardiovascular Screening:    General: Screening Not Indicated and History Lipid Disorder      Diabetes Screening:     General: Screening Current      Colorectal Cancer Screening:     General: Screening Current      Breast Cancer Screening:     General: Screening Current      Cervical Cancer Screening:    General: Screening Not Indicated      Osteoporosis Screening:    General: Screening Current      Abdominal Aortic Aneurysm (AAA) Screening:        General: Screening Current      Lung Cancer Screening:     General: Screening Not Indicated      Hepatitis C Screening:    General: Screening Current    No results found       Physical Exam:     /60 (BP Location: Left arm, Patient Position: Sitting, Cuff Size: Adult)   Pulse 67   Temp (!) 97 3 °F (36 3 °C)   Resp 16   Ht 5' 1 5" (1 562 m)   Wt 57 3 kg (126 lb 6 4 oz)   SpO2 98%   BMI 23 50 kg/m²     Physical Exam     Linda Harrison MD

## 2023-01-23 ENCOUNTER — HOSPITAL ENCOUNTER (OUTPATIENT)
Dept: RADIOLOGY | Age: 73
Discharge: HOME | End: 2023-01-23

## 2023-01-23 VITALS — WEIGHT: 126 LBS | HEIGHT: 62 IN | BODY MASS INDEX: 23.19 KG/M2

## 2023-01-23 DIAGNOSIS — Z12.31 ENCOUNTER FOR SCREENING MAMMOGRAM FOR MALIGNANT NEOPLASM OF BREAST: ICD-10-CM

## 2023-06-20 ENCOUNTER — OFFICE VISIT (OUTPATIENT)
Dept: FAMILY MEDICINE CLINIC | Facility: CLINIC | Age: 73
End: 2023-06-20
Payer: COMMERCIAL

## 2023-06-20 VITALS
BODY MASS INDEX: 24.51 KG/M2 | WEIGHT: 129.8 LBS | HEIGHT: 61 IN | OXYGEN SATURATION: 97 % | TEMPERATURE: 97.2 F | HEART RATE: 73 BPM | DIASTOLIC BLOOD PRESSURE: 80 MMHG | SYSTOLIC BLOOD PRESSURE: 120 MMHG

## 2023-06-20 DIAGNOSIS — D50.9 IRON DEFICIENCY ANEMIA, UNSPECIFIED IRON DEFICIENCY ANEMIA TYPE: ICD-10-CM

## 2023-06-20 DIAGNOSIS — I47.1 PAROXYSMAL SVT (SUPRAVENTRICULAR TACHYCARDIA) (HCC): ICD-10-CM

## 2023-06-20 DIAGNOSIS — R92.8 ABNORMAL MAMMOGRAM: ICD-10-CM

## 2023-06-20 DIAGNOSIS — U07.1 COVID-19: ICD-10-CM

## 2023-06-20 DIAGNOSIS — E03.8 SUBCLINICAL HYPOTHYROIDISM: Primary | Chronic | ICD-10-CM

## 2023-06-20 PROCEDURE — 99214 OFFICE O/P EST MOD 30 MIN: CPT | Performed by: FAMILY MEDICINE

## 2023-06-20 RX ORDER — CONJUGATED ESTROGENS 0.62 MG/G
CREAM VAGINAL
COMMUNITY

## 2023-06-20 NOTE — PATIENT INSTRUCTIONS
The FDA and CDC have recently recommended yet another booster    It is available at your drugstore at no cost

## 2023-06-20 NOTE — PROGRESS NOTES
Assessment/Plan:    No problem-specific Assessment & Plan notes found for this encounter  Diagnoses and all orders for this visit:    Subclinical hypothyroidism    Paroxysmal SVT (supraventricular tachycardia) (HCC)    Abnormal mammogram    COVID-19    Iron deficiency anemia, unspecified iron deficiency anemia type    Other orders  -     estrogens, conjugated (Premarin) vaginal cream; Insert into the vagina          Subjective:      Patient ID: Tanmay Maharaj is a 74 y o  female  PATIENT RETURNS FOR FOLLOW-UP OF CHRONIC MEDICAL CONDITIONS  ANY HOSPITAL VISITS, EMERGENCY VISITS AND OTHER PROVIDER VISITS SINCE LAST TIME WERE REVIEWED  MEDS WERE REVIEWED AND NO SIDE EFFECTS  NO NEW ISSUES  UNLESS NOTED BELOW  NO NEW MEDICAL PROVIDER REPORTED  THE CHRONIC DISEASES LISTED ABOVE ARE STABLE AND UNCHANGED/ THE PLAN OF CARE FOR THOSE WILL REMAIN UNCHANGED UNLESS NOTED BELOW  The following portions of the patient's history were reviewed and updated as appropriate: allergies, current medications, past family history, past medical history, past social history, past surgical history and problem list     Review of Systems   Constitutional: Negative for activity change and appetite change  HENT: Negative for voice change  Eyes: Negative for visual disturbance  Respiratory: Negative for chest tightness and shortness of breath  Cardiovascular: Positive for palpitations  Negative for chest pain and leg swelling  Gastrointestinal: Negative for abdominal pain, blood in stool, constipation and diarrhea  Genitourinary: Negative for dysuria, vaginal bleeding and vaginal discharge  Skin: Negative for rash  Neurological: Negative for dizziness  Psychiatric/Behavioral: Negative for dysphoric mood  Objective: There were no vitals filed for this visit  Physical Exam  Constitutional:       Appearance: She is well-developed  HENT:      Head: Normocephalic and atraumatic     Eyes: Conjunctiva/sclera: Conjunctivae normal    Neck:      Thyroid: No thyromegaly  Cardiovascular:      Rate and Rhythm: Normal rate and regular rhythm  Heart sounds: Normal heart sounds  No murmur heard  Pulmonary:      Effort: Pulmonary effort is normal  No respiratory distress  Breath sounds: Normal breath sounds  Musculoskeletal:      Cervical back: Neck supple  Lymphadenopathy:      Cervical: No cervical adenopathy  Skin:     General: Skin is warm and dry  Psychiatric:         Behavior: Behavior normal            Patient's chronic problems that were reviewed today are stable  Recent hospital stays reviewed  Recent labs and imaging reviewed  Recent visits to other providers reviewed  Meds reviewed and no changes made  Appropriate labs and imaging were ordered  Preventive measures appropriate for age and sex were reviewed with patient  Immunizations were updated as appropriate

## 2023-07-17 DIAGNOSIS — L25.5 RHUS DERMATITIS: ICD-10-CM

## 2023-07-17 RX ORDER — FLUOCINONIDE TOPICAL SOLUTION USP, 0.05% 0.5 MG/ML
SOLUTION TOPICAL
Qty: 60 ML | Refills: 2 | Status: SHIPPED | OUTPATIENT
Start: 2023-07-17

## 2024-01-03 ENCOUNTER — OFFICE VISIT (OUTPATIENT)
Dept: FAMILY MEDICINE CLINIC | Facility: CLINIC | Age: 74
End: 2024-01-03

## 2024-01-03 VITALS
HEART RATE: 70 BPM | DIASTOLIC BLOOD PRESSURE: 70 MMHG | WEIGHT: 130.8 LBS | OXYGEN SATURATION: 98 % | BODY MASS INDEX: 24.7 KG/M2 | RESPIRATION RATE: 18 BRPM | TEMPERATURE: 98.1 F | SYSTOLIC BLOOD PRESSURE: 120 MMHG | HEIGHT: 61 IN

## 2024-01-03 DIAGNOSIS — Z86.010 HX OF COLONIC POLYPS: ICD-10-CM

## 2024-01-03 DIAGNOSIS — M85.80 OSTEOPENIA, UNSPECIFIED LOCATION: ICD-10-CM

## 2024-01-03 DIAGNOSIS — E78.5 HYPERLIPIDEMIA, UNSPECIFIED HYPERLIPIDEMIA TYPE: ICD-10-CM

## 2024-01-03 DIAGNOSIS — R92.8 ABNORMAL MAMMOGRAM: ICD-10-CM

## 2024-01-03 DIAGNOSIS — Z23 ENCOUNTER FOR IMMUNIZATION: ICD-10-CM

## 2024-01-03 DIAGNOSIS — D50.9 IRON DEFICIENCY ANEMIA, UNSPECIFIED IRON DEFICIENCY ANEMIA TYPE: ICD-10-CM

## 2024-01-03 DIAGNOSIS — E03.8 SUBCLINICAL HYPOTHYROIDISM: Primary | Chronic | ICD-10-CM

## 2024-01-03 DIAGNOSIS — I47.10 PAROXYSMAL SVT (SUPRAVENTRICULAR TACHYCARDIA): ICD-10-CM

## 2024-01-03 DIAGNOSIS — Z00.00 MEDICARE ANNUAL WELLNESS VISIT, SUBSEQUENT: Chronic | ICD-10-CM

## 2024-01-03 DIAGNOSIS — U07.1 COVID-19: ICD-10-CM

## 2024-01-03 NOTE — PROGRESS NOTES
"Assessment/Plan:    No problem-specific Assessment & Plan notes found for this encounter.             Subjective:      Patient ID: Yign Rodas is a 73 y.o. female.      PATIENT RETURNS FOR FOLLOW-UP OF CHRONIC MEDICAL CONDITIONS.  ANY HOSPITAL VISITS, EMERGENCY VISITS AND OTHER PROVIDER VISITS SINCE LAST TIME WERE REVIEWED.  MEDS WERE REVIEWED AND NO SIDE EFFECTS.  NO NEW ISSUES  UNLESS NOTED BELOW. NO NEW MEDICAL PROVIDER REPORTED. THE CHRONIC DISEASES LISTED ABOVE ARE STABLE AND UNCHANGED/ THE PLAN OF CARE FOR THOSE WILL REMAIN UNCHANGED UNLESS NOTED BELOW.      Awv/sub     Ear Fullness   Pertinent negatives include no abdominal pain, diarrhea or rash.       The following portions of the patient's history were reviewed and updated as appropriate: allergies, current medications, past family history, past medical history, past social history, past surgical history and problem list.    Review of Systems   Constitutional:  Negative for activity change and appetite change.   HENT:  Negative for voice change.    Eyes:  Negative for visual disturbance.   Respiratory:  Negative for chest tightness and shortness of breath.    Cardiovascular:  Negative for chest pain, palpitations and leg swelling.   Gastrointestinal:  Negative for abdominal pain, blood in stool, constipation and diarrhea.   Genitourinary:  Negative for dysuria, vaginal bleeding and vaginal discharge.   Skin:  Negative for rash.   Neurological:  Negative for dizziness.   Psychiatric/Behavioral:  Negative for dysphoric mood.          Objective:  Vitals:    01/03/24 0926   BP: 120/70   BP Location: Left arm   Patient Position: Sitting   Cuff Size: Standard   Pulse: 70   Resp: 18   Temp: 98.1 °F (36.7 °C)   TempSrc: Temporal   SpO2: 98%   Weight: 59.3 kg (130 lb 12.8 oz)   Height: 5' 1\" (1.549 m)      Physical Exam  Constitutional:       Appearance: Normal appearance.   HENT:      Head: Normocephalic and atraumatic.      Right Ear: Tympanic membrane and " ear canal normal.      Left Ear: Tympanic membrane and ear canal normal.      Mouth/Throat:      Mouth: Mucous membranes are moist.      Pharynx: Oropharynx is clear.   Eyes:      Conjunctiva/sclera: Conjunctivae normal.   Cardiovascular:      Rate and Rhythm: Normal rate and regular rhythm.      Pulses: Normal pulses.      Heart sounds: Normal heart sounds. No murmur heard.  Pulmonary:      Effort: Pulmonary effort is normal. No respiratory distress.      Breath sounds: Normal breath sounds.   Musculoskeletal:      Cervical back: Neck supple.   Lymphadenopathy:      Cervical: No cervical adenopathy.   Neurological:      Mental Status: She is alert. Mental status is at baseline.   Psychiatric:         Mood and Affect: Mood normal.         Thought Content: Thought content normal.         Patient's chronic problems that were reviewed today are stable. Recent hospital stays reviewed. Recent labs and imaging reviewed. Recent visits to other providers reviewed. Meds reviewed and no changes made. Appropriate labs and imaging were ordered. Preventive measures appropriate for age and sex were reviewed with patient. Immunizations were updated as appropriate.

## 2024-01-03 NOTE — PATIENT INSTRUCTIONS
Medicare Preventive Visit Patient Instructions  Thank you for completing your Welcome to Medicare Visit or Medicare Annual Wellness Visit today. Your next wellness visit will be due in one year (1/3/2025).  The screening/preventive services that you may require over the next 5-10 years are detailed below. Some tests may not apply to you based off risk factors and/or age. Screening tests ordered at today's visit but not completed yet may show as past due. Also, please note that scanned in results may not display below.  Preventive Screenings:  Service Recommendations Previous Testing/Comments   Colorectal Cancer Screening  * Colonoscopy    * Fecal Occult Blood Test (FOBT)/Fecal Immunochemical Test (FIT)  * Fecal DNA/Cologuard Test  * Flexible Sigmoidoscopy Age: 45-75 years old   Colonoscopy: every 10 years (may be performed more frequently if at higher risk)  OR  FOBT/FIT: every 1 year  OR  Cologuard: every 3 years  OR  Sigmoidoscopy: every 5 years  Screening may be recommended earlier than age 45 if at higher risk for colorectal cancer. Also, an individualized decision between you and your healthcare provider will decide whether screening between the ages of 76-85 would be appropriate. Colonoscopy: 07/28/2023  FOBT/FIT: Not on file  Cologuard: Not on file  Sigmoidoscopy: Not on file    Screening Current  Screening Current  Screening Current     Breast Cancer Screening Age: 40+ years old  Frequency: every 1-2 years  Not required if history of left and right mastectomy Mammogram: 01/23/2023    Screening Current  Screening Current  Screening Current   Cervical Cancer Screening Between the ages of 21-29, pap smear recommended once every 3 years.   Between the ages of 30-65, can perform pap smear with HPV co-testing every 5 years.   Recommendations may differ for women with a history of total hysterectomy, cervical cancer, or abnormal pap smears in past. Pap Smear: Not on file    Screening Not Indicated  Screening Not  Indicated  Screening Not Indicated   Hepatitis C Screening Once for adults born between 1945 and 1965  More frequently in patients at high risk for Hepatitis C Hep C Antibody: 12/26/2018    Screening Current  Screening Current  Screening Current   Diabetes Screening 1-2 times per year if you're at risk for diabetes or have pre-diabetes Fasting glucose: No results in last 5 years (No results in last 5 years)  A1C: No results in last 5 years (No results in last 5 years)  Screening Current  Screening Current   Cholesterol Screening Once every 5 years if you don't have a lipid disorder. May order more often based on risk factors. Lipid panel: 03/09/2022    Screening Not Indicated  History Lipid Disorder  Screening Not Indicated  History Lipid Disorder  Screening Not Indicated  History Lipid Disorder     Other Preventive Screenings Covered by Medicare:  Abdominal Aortic Aneurysm (AAA) Screening: covered once if your at risk. You're considered to be at risk if you have a family history of AAA.  Lung Cancer Screening: covers low dose CT scan once per year if you meet all of the following conditions: (1) Age 55-77; (2) No signs or symptoms of lung cancer; (3) Current smoker or have quit smoking within the last 15 years; (4) You have a tobacco smoking history of at least 20 pack years (packs per day multiplied by number of years you smoked); (5) You get a written order from a healthcare provider.  Glaucoma Screening: covered annually if you're considered high risk: (1) You have diabetes OR (2) Family history of glaucoma OR (3)  aged 50 and older OR (4)  American aged 65 and older  Osteoporosis Screening: covered every 2 years if you meet one of the following conditions: (1) You're estrogen deficient and at risk for osteoporosis based off medical history and other findings; (2) Have a vertebral abnormality; (3) On glucocorticoid therapy for more than 3 months; (4) Have primary hyperparathyroidism; (5)  On osteoporosis medications and need to assess response to drug therapy.   Last bone density test (DXA Scan): 06/01/2022.  HIV Screening: covered annually if you're between the age of 15-65. Also covered annually if you are younger than 15 and older than 65 with risk factors for HIV infection. For pregnant patients, it is covered up to 3 times per pregnancy.    Immunizations:  Immunization Recommendations   Influenza Vaccine Annual influenza vaccination during flu season is recommended for all persons aged >= 6 months who do not have contraindications   Pneumococcal Vaccine   * Pneumococcal conjugate vaccine = PCV13 (Prevnar 13), PCV15 (Vaxneuvance), PCV20 (Prevnar 20)  * Pneumococcal polysaccharide vaccine = PPSV23 (Pneumovax) Adults 19-63 yo with certain risk factors or if 65+ yo  If never received any pneumonia vaccine: recommend Prevnar 20 (PCV20)  Give PCV20 if previously received 1 dose of PCV13 or PPSV23   Hepatitis B Vaccine 3 dose series if at intermediate or high risk (ex: diabetes, end stage renal disease, liver disease)   Respiratory syncytial virus (RSV) Vaccine - COVERED BY MEDICARE PART D  * RSVPreF3 (Arexvy) CDC recommends that adults 60 years of age and older may receive a single dose of RSV vaccine using shared clinical decision-making (SCDM)   Tetanus (Td) Vaccine - COST NOT COVERED BY MEDICARE PART B Following completion of primary series, a booster dose should be given every 10 years to maintain immunity against tetanus. Td may also be given as tetanus wound prophylaxis.   Tdap Vaccine - COST NOT COVERED BY MEDICARE PART B Recommended at least once for all adults. For pregnant patients, recommended with each pregnancy.   Shingles Vaccine (Shingrix) - COST NOT COVERED BY MEDICARE PART B  2 shot series recommended in those 19 years and older who have or will have weakened immune systems or those 50 years and older     Health Maintenance Due:      Topic Date Due   • Breast Cancer Screening:  Mammogram  01/23/2024   • Colorectal Cancer Screening  07/28/2028   • Hepatitis C Screening  Completed     Immunizations Due:      Topic Date Due   • Influenza Vaccine (1) 09/01/2023   • COVID-19 Vaccine (5 - 2023-24 season) 09/01/2023     Advance Directives   What are advance directives?  Advance directives are legal documents that state your wishes and plans for medical care. These plans are made ahead of time in case you lose your ability to make decisions for yourself. Advance directives can apply to any medical decision, such as the treatments you want, and if you want to donate organs.   What are the types of advance directives?  There are many types of advance directives, and each state has rules about how to use them. You may choose a combination of any of the following:  Living will:  This is a written record of the treatment you want. You can also choose which treatments you do not want, which to limit, and which to stop at a certain time. This includes surgery, medicine, IV fluid, and tube feedings.   Durable power of  for healthcare (DPAHC):  This is a written record that states who you want to make healthcare choices for you when you are unable to make them for yourself. This person, called a proxy, is usually a family member or a friend. You may choose more than 1 proxy.  Do not resuscitate (DNR) order:  A DNR order is used in case your heart stops beating or you stop breathing. It is a request not to have certain forms of treatment, such as CPR. A DNR order may be included in other types of advance directives.  Medical directive:  This covers the care that you want if you are in a coma, near death, or unable to make decisions for yourself. You can list the treatments you want for each condition. Treatment may include pain medicine, surgery, blood transfusions, dialysis, IV or tube feedings, and a ventilator (breathing machine).  Values history:  This document has questions about your views,  beliefs, and how you feel and think about life. This information can help others choose the care that you would choose.  Why are advance directives important?  An advance directive helps you control your care. Although spoken wishes may be used, it is better to have your wishes written down. Spoken wishes can be misunderstood, or not followed. Treatments may be given even if you do not want them. An advance directive may make it easier for your family to make difficult choices about your care.   Fall Prevention    Fall prevention  includes ways to make your home and other areas safer. It also includes ways you can move more carefully to prevent a fall. Health conditions that cause changes in your blood pressure, vision, or muscle strength and coordination may increase your risk for falls. Medicines may also increase your risk for falls if they make you dizzy, weak, or sleepy.   Fall prevention tips:   Stand or sit up slowly.    Use assistive devices as directed.    Wear shoes that fit well and have soles that .    Wear a personal alarm.    Stay active.    Manage your medical conditions.    Home Safety Tips:  Add items to prevent falls in the bathroom.    Keep paths clear.    Install bright lights in your home.    Keep items you use often on shelves within reach.    Paint or place reflective tape on the edges of your stairs.       © Copyright [employer] 2018 Information is for End User's use only and may not be sold, redistributed or otherwise used for commercial purposes. All illustrations and images included in CareNotes® are the copyrighted property of [employer] or [employer]      Medicare Preventive Visit Patient Instructions  Thank you for completing your Welcome to Medicare Visit or Medicare Annual Wellness Visit today. Your next wellness visit will be due in one year (1/3/2025).  The screening/preventive services that you may require over the next 5-10 years are detailed below. Some tests  may not apply to you based off risk factors and/or age. Screening tests ordered at today's visit but not completed yet may show as past due. Also, please note that scanned in results may not display below.  Preventive Screenings:  Service Recommendations Previous Testing/Comments   Colorectal Cancer Screening  * Colonoscopy    * Fecal Occult Blood Test (FOBT)/Fecal Immunochemical Test (FIT)  * Fecal DNA/Cologuard Test  * Flexible Sigmoidoscopy Age: 45-75 years old   Colonoscopy: every 10 years (may be performed more frequently if at higher risk)  OR  FOBT/FIT: every 1 year  OR  Cologuard: every 3 years  OR  Sigmoidoscopy: every 5 years  Screening may be recommended earlier than age 45 if at higher risk for colorectal cancer. Also, an individualized decision between you and your healthcare provider will decide whether screening between the ages of 76-85 would be appropriate. Colonoscopy: 07/28/2023  FOBT/FIT: Not on file  Cologuard: Not on file  Sigmoidoscopy: Not on file    Screening Current  Screening Current  Screening Current     Breast Cancer Screening Age: 40+ years old  Frequency: every 1-2 years  Not required if history of left and right mastectomy Mammogram: 01/23/2023    Screening Current  Screening Current  Screening Current   Cervical Cancer Screening Between the ages of 21-29, pap smear recommended once every 3 years.   Between the ages of 30-65, can perform pap smear with HPV co-testing every 5 years.   Recommendations may differ for women with a history of total hysterectomy, cervical cancer, or abnormal pap smears in past. Pap Smear: Not on file    Screening Not Indicated  Screening Not Indicated  Screening Not Indicated   Hepatitis C Screening Once for adults born between 1945 and 1965  More frequently in patients at high risk for Hepatitis C Hep C Antibody: 12/26/2018    Screening Current  Screening Current  Screening Current   Diabetes Screening 1-2 times per year if you're at risk for diabetes or  have pre-diabetes Fasting glucose: No results in last 5 years (No results in last 5 years)  A1C: No results in last 5 years (No results in last 5 years)  Screening Current  Screening Current   Cholesterol Screening Once every 5 years if you don't have a lipid disorder. May order more often based on risk factors. Lipid panel: 03/09/2022    Screening Not Indicated  History Lipid Disorder  Screening Not Indicated  History Lipid Disorder  Screening Not Indicated  History Lipid Disorder     Other Preventive Screenings Covered by Medicare:  Abdominal Aortic Aneurysm (AAA) Screening: covered once if your at risk. You're considered to be at risk if you have a family history of AAA.  Lung Cancer Screening: covers low dose CT scan once per year if you meet all of the following conditions: (1) Age 55-77; (2) No signs or symptoms of lung cancer; (3) Current smoker or have quit smoking within the last 15 years; (4) You have a tobacco smoking history of at least 20 pack years (packs per day multiplied by number of years you smoked); (5) You get a written order from a healthcare provider.  Glaucoma Screening: covered annually if you're considered high risk: (1) You have diabetes OR (2) Family history of glaucoma OR (3)  aged 50 and older OR (4)  American aged 65 and older  Osteoporosis Screening: covered every 2 years if you meet one of the following conditions: (1) You're estrogen deficient and at risk for osteoporosis based off medical history and other findings; (2) Have a vertebral abnormality; (3) On glucocorticoid therapy for more than 3 months; (4) Have primary hyperparathyroidism; (5) On osteoporosis medications and need to assess response to drug therapy.   Last bone density test (DXA Scan): 06/01/2022.  HIV Screening: covered annually if you're between the age of 15-65. Also covered annually if you are younger than 15 and older than 65 with risk factors for HIV infection. For pregnant patients, it  is covered up to 3 times per pregnancy.    Immunizations:  Immunization Recommendations   Influenza Vaccine Annual influenza vaccination during flu season is recommended for all persons aged >= 6 months who do not have contraindications   Pneumococcal Vaccine   * Pneumococcal conjugate vaccine = PCV13 (Prevnar 13), PCV15 (Vaxneuvance), PCV20 (Prevnar 20)  * Pneumococcal polysaccharide vaccine = PPSV23 (Pneumovax) Adults 19-65 yo with certain risk factors or if 65+ yo  If never received any pneumonia vaccine: recommend Prevnar 20 (PCV20)  Give PCV20 if previously received 1 dose of PCV13 or PPSV23   Hepatitis B Vaccine 3 dose series if at intermediate or high risk (ex: diabetes, end stage renal disease, liver disease)   Respiratory syncytial virus (RSV) Vaccine - COVERED BY MEDICARE PART D  * RSVPreF3 (Arexvy) CDC recommends that adults 60 years of age and older may receive a single dose of RSV vaccine using shared clinical decision-making (SCDM)   Tetanus (Td) Vaccine - COST NOT COVERED BY MEDICARE PART B Following completion of primary series, a booster dose should be given every 10 years to maintain immunity against tetanus. Td may also be given as tetanus wound prophylaxis.   Tdap Vaccine - COST NOT COVERED BY MEDICARE PART B Recommended at least once for all adults. For pregnant patients, recommended with each pregnancy.   Shingles Vaccine (Shingrix) - COST NOT COVERED BY MEDICARE PART B  2 shot series recommended in those 19 years and older who have or will have weakened immune systems or those 50 years and older     Health Maintenance Due:      Topic Date Due   • Breast Cancer Screening: Mammogram  01/23/2024   • Colorectal Cancer Screening  07/28/2028   • Hepatitis C Screening  Completed     Immunizations Due:      Topic Date Due   • Influenza Vaccine (1) 09/01/2023   • COVID-19 Vaccine (5 - 2023-24 season) 09/01/2023     Advance Directives   What are advance directives?  Advance directives are legal  documents that state your wishes and plans for medical care. These plans are made ahead of time in case you lose your ability to make decisions for yourself. Advance directives can apply to any medical decision, such as the treatments you want, and if you want to donate organs.   What are the types of advance directives?  There are many types of advance directives, and each state has rules about how to use them. You may choose a combination of any of the following:  Living will:  This is a written record of the treatment you want. You can also choose which treatments you do not want, which to limit, and which to stop at a certain time. This includes surgery, medicine, IV fluid, and tube feedings.   Durable power of  for healthcare (DPAHC):  This is a written record that states who you want to make healthcare choices for you when you are unable to make them for yourself. This person, called a proxy, is usually a family member or a friend. You may choose more than 1 proxy.  Do not resuscitate (DNR) order:  A DNR order is used in case your heart stops beating or you stop breathing. It is a request not to have certain forms of treatment, such as CPR. A DNR order may be included in other types of advance directives.  Medical directive:  This covers the care that you want if you are in a coma, near death, or unable to make decisions for yourself. You can list the treatments you want for each condition. Treatment may include pain medicine, surgery, blood transfusions, dialysis, IV or tube feedings, and a ventilator (breathing machine).  Values history:  This document has questions about your views, beliefs, and how you feel and think about life. This information can help others choose the care that you would choose.  Why are advance directives important?  An advance directive helps you control your care. Although spoken wishes may be used, it is better to have your wishes written down. Spoken wishes can be  misunderstood, or not followed. Treatments may be given even if you do not want them. An advance directive may make it easier for your family to make difficult choices about your care.   Fall Prevention    Fall prevention  includes ways to make your home and other areas safer. It also includes ways you can move more carefully to prevent a fall. Health conditions that cause changes in your blood pressure, vision, or muscle strength and coordination may increase your risk for falls. Medicines may also increase your risk for falls if they make you dizzy, weak, or sleepy.   Fall prevention tips:   Stand or sit up slowly.    Use assistive devices as directed.    Wear shoes that fit well and have soles that .    Wear a personal alarm.    Stay active.    Manage your medical conditions.    Home Safety Tips:  Add items to prevent falls in the bathroom.    Keep paths clear.    Install bright lights in your home.    Keep items you use often on shelves within reach.    Paint or place reflective tape on the edges of your stairs.       © Copyright [employer] 2018 Information is for End User's use only and may not be sold, redistributed or otherwise used for commercial purposes. All illustrations and images included in CareNotes® are the copyrighted property of [employer] or [employer]      The recommended replacement doses for vitamin D and calcium are as follows    Calcium should be around 1200 mg daily.  Vitamin D should be around 800 mcg daily.  Usually Caltrate D is a good product that has combination.      Medicare Preventive Visit Patient Instructions  Thank you for completing your Welcome to Medicare Visit or Medicare Annual Wellness Visit today. Your next wellness visit will be due in one year (1/3/2025).  The screening/preventive services that you may require over the next 5-10 years are detailed below. Some tests may not apply to you based off risk factors and/or age. Screening tests ordered at  today's visit but not completed yet may show as past due. Also, please note that scanned in results may not display below.  Preventive Screenings:  Service Recommendations Previous Testing/Comments   Colorectal Cancer Screening  * Colonoscopy    * Fecal Occult Blood Test (FOBT)/Fecal Immunochemical Test (FIT)  * Fecal DNA/Cologuard Test  * Flexible Sigmoidoscopy Age: 45-75 years old   Colonoscopy: every 10 years (may be performed more frequently if at higher risk)  OR  FOBT/FIT: every 1 year  OR  Cologuard: every 3 years  OR  Sigmoidoscopy: every 5 years  Screening may be recommended earlier than age 45 if at higher risk for colorectal cancer. Also, an individualized decision between you and your healthcare provider will decide whether screening between the ages of 76-85 would be appropriate. Colonoscopy: 07/28/2023  FOBT/FIT: Not on file  Cologuard: Not on file  Sigmoidoscopy: Not on file    Screening Current  Screening Current  Screening Current     Breast Cancer Screening Age: 40+ years old  Frequency: every 1-2 years  Not required if history of left and right mastectomy Mammogram: 01/23/2023    Screening Current  Screening Current  Screening Current   Cervical Cancer Screening Between the ages of 21-29, pap smear recommended once every 3 years.   Between the ages of 30-65, can perform pap smear with HPV co-testing every 5 years.   Recommendations may differ for women with a history of total hysterectomy, cervical cancer, or abnormal pap smears in past. Pap Smear: Not on file    Screening Not Indicated  Screening Not Indicated  Screening Not Indicated   Hepatitis C Screening Once for adults born between 1945 and 1965  More frequently in patients at high risk for Hepatitis C Hep C Antibody: 12/26/2018    Screening Current  Screening Current  Screening Current   Diabetes Screening 1-2 times per year if you're at risk for diabetes or have pre-diabetes Fasting glucose: No results in last 5 years (No results in last 5  years)  A1C: No results in last 5 years (No results in last 5 years)  Screening Current  Screening Current   Cholesterol Screening Once every 5 years if you don't have a lipid disorder. May order more often based on risk factors. Lipid panel: 03/09/2022    Screening Not Indicated  History Lipid Disorder  Screening Not Indicated  History Lipid Disorder  Screening Not Indicated  History Lipid Disorder     Other Preventive Screenings Covered by Medicare:  Abdominal Aortic Aneurysm (AAA) Screening: covered once if your at risk. You're considered to be at risk if you have a family history of AAA.  Lung Cancer Screening: covers low dose CT scan once per year if you meet all of the following conditions: (1) Age 55-77; (2) No signs or symptoms of lung cancer; (3) Current smoker or have quit smoking within the last 15 years; (4) You have a tobacco smoking history of at least 20 pack years (packs per day multiplied by number of years you smoked); (5) You get a written order from a healthcare provider.  Glaucoma Screening: covered annually if you're considered high risk: (1) You have diabetes OR (2) Family history of glaucoma OR (3)  aged 50 and older OR (4)  American aged 65 and older  Osteoporosis Screening: covered every 2 years if you meet one of the following conditions: (1) You're estrogen deficient and at risk for osteoporosis based off medical history and other findings; (2) Have a vertebral abnormality; (3) On glucocorticoid therapy for more than 3 months; (4) Have primary hyperparathyroidism; (5) On osteoporosis medications and need to assess response to drug therapy.   Last bone density test (DXA Scan): 06/01/2022.  HIV Screening: covered annually if you're between the age of 15-65. Also covered annually if you are younger than 15 and older than 65 with risk factors for HIV infection. For pregnant patients, it is covered up to 3 times per pregnancy.    Immunizations:  Immunization  Recommendations   Influenza Vaccine Annual influenza vaccination during flu season is recommended for all persons aged >= 6 months who do not have contraindications   Pneumococcal Vaccine   * Pneumococcal conjugate vaccine = PCV13 (Prevnar 13), PCV15 (Vaxneuvance), PCV20 (Prevnar 20)  * Pneumococcal polysaccharide vaccine = PPSV23 (Pneumovax) Adults 19-65 yo with certain risk factors or if 65+ yo  If never received any pneumonia vaccine: recommend Prevnar 20 (PCV20)  Give PCV20 if previously received 1 dose of PCV13 or PPSV23   Hepatitis B Vaccine 3 dose series if at intermediate or high risk (ex: diabetes, end stage renal disease, liver disease)   Respiratory syncytial virus (RSV) Vaccine - COVERED BY MEDICARE PART D  * RSVPreF3 (Arexvy) CDC recommends that adults 60 years of age and older may receive a single dose of RSV vaccine using shared clinical decision-making (SCDM)   Tetanus (Td) Vaccine - COST NOT COVERED BY MEDICARE PART B Following completion of primary series, a booster dose should be given every 10 years to maintain immunity against tetanus. Td may also be given as tetanus wound prophylaxis.   Tdap Vaccine - COST NOT COVERED BY MEDICARE PART B Recommended at least once for all adults. For pregnant patients, recommended with each pregnancy.   Shingles Vaccine (Shingrix) - COST NOT COVERED BY MEDICARE PART B  2 shot series recommended in those 19 years and older who have or will have weakened immune systems or those 50 years and older     Health Maintenance Due:      Topic Date Due   • Breast Cancer Screening: Mammogram  01/23/2024   • Colorectal Cancer Screening  07/28/2028   • Hepatitis C Screening  Completed     Immunizations Due:      Topic Date Due   • Influenza Vaccine (1) 09/01/2023   • COVID-19 Vaccine (5 - 2023-24 season) 09/01/2023     Advance Directives   What are advance directives?  Advance directives are legal documents that state your wishes and plans for medical care. These plans are  made ahead of time in case you lose your ability to make decisions for yourself. Advance directives can apply to any medical decision, such as the treatments you want, and if you want to donate organs.   What are the types of advance directives?  There are many types of advance directives, and each state has rules about how to use them. You may choose a combination of any of the following:  Living will:  This is a written record of the treatment you want. You can also choose which treatments you do not want, which to limit, and which to stop at a certain time. This includes surgery, medicine, IV fluid, and tube feedings.   Durable power of  for healthcare (DPAHC):  This is a written record that states who you want to make healthcare choices for you when you are unable to make them for yourself. This person, called a proxy, is usually a family member or a friend. You may choose more than 1 proxy.  Do not resuscitate (DNR) order:  A DNR order is used in case your heart stops beating or you stop breathing. It is a request not to have certain forms of treatment, such as CPR. A DNR order may be included in other types of advance directives.  Medical directive:  This covers the care that you want if you are in a coma, near death, or unable to make decisions for yourself. You can list the treatments you want for each condition. Treatment may include pain medicine, surgery, blood transfusions, dialysis, IV or tube feedings, and a ventilator (breathing machine).  Values history:  This document has questions about your views, beliefs, and how you feel and think about life. This information can help others choose the care that you would choose.  Why are advance directives important?  An advance directive helps you control your care. Although spoken wishes may be used, it is better to have your wishes written down. Spoken wishes can be misunderstood, or not followed. Treatments may be given even if you do not want them.  An advance directive may make it easier for your family to make difficult choices about your care.   Fall Prevention    Fall prevention  includes ways to make your home and other areas safer. It also includes ways you can move more carefully to prevent a fall. Health conditions that cause changes in your blood pressure, vision, or muscle strength and coordination may increase your risk for falls. Medicines may also increase your risk for falls if they make you dizzy, weak, or sleepy.   Fall prevention tips:   Stand or sit up slowly.    Use assistive devices as directed.    Wear shoes that fit well and have soles that .    Wear a personal alarm.    Stay active.    Manage your medical conditions.    Home Safety Tips:  Add items to prevent falls in the bathroom.    Keep paths clear.    Install bright lights in your home.    Keep items you use often on shelves within reach.    Paint or place reflective tape on the edges of your stairs.       © Copyright [employer] 2018 Information is for End User's use only and may not be sold, redistributed or otherwise used for commercial purposes. All illustrations and images included in CareNotes® are the copyrighted property of [employer] Inc[employer] or [employer]

## 2024-01-03 NOTE — PROGRESS NOTES
Assessment and Plan:     Problem List Items Addressed This Visit       Hx of colonic polyps    Hyperlipidemia    Iron deficiency anemia    Abnormal mammogram    Medicare annual wellness visit, subsequent (Chronic)    Paroxysmal SVT (supraventricular tachycardia)    Subclinical hypothyroidism - Primary (Chronic)    COVID-19        Preventive health issues were discussed with patient, and age appropriate screening tests were ordered as noted in patient's After Visit Summary.  Personalized health advice and appropriate referrals for health education or preventive services given if needed, as noted in patient's After Visit Summary.     History of Present Illness:     Patient presents for a Medicare Wellness Visit    Ear Fullness        Patient Care Team:  Stephane Kingston MD as PCP - General  Stephane Kingston MD as PCP - PCP-[facility] ([facility], [place])     Review of Systems:     Review of Systems     Problem List:     Patient Active Problem List   Diagnosis    Hx of colonic polyps    Hyperlipidemia    Iron deficiency anemia    Abnormal mammogram    Family history of aneurysm    Medicare annual wellness visit, subsequent    Paroxysmal SVT (supraventricular tachycardia)    Incomplete uterovaginal prolapse    Cystocele, midline    Pelvic muscle wasting    Hypermobility of urethra    Subclinical hypothyroidism    COVID-19      Past Medical and Surgical History:     Past Medical History:   Diagnosis Date    Anemia     Bulging of cervical intervertebral disc     Bulging disk neck.    Colon polyp 03/2009    History of anemia     9/27/21 Pt reports hx of anemia - corrected through prescription iron medication     History of narrow angle glaucoma 2012    Irregular heart beat     Takes for arrthymia    SVT (supraventricular tachycardia)     Palpitations      Past Surgical History:   Procedure Laterality Date    BREAST BIOPSY Right 2005    Stereo benign    CARDIAC ELECTROPHYSIOLOGY STUDY AND ABLATION  in 2014    COLONOSCOPY   03/25/2009    HERNIA REPAIR      Inguinal hernia - right side    POLYPECTOMY  03/25/2009    IA CMBND ANTERPOST COLPORRAPHY W/CYSTO N/A 10/4/2021    Procedure: Anterior COLPORRHAPHY;  Surgeon: Urbano Mcgrath MD;  [facility];  Service: UroGynecology           IA COLPOPEXY VAGINAL EXTRAPERITONEAL APPROACH N/A 10/4/2021    Procedure: COLPOSUSPENSION VAGINAL EXTRAPERITONEAL(ENPLACE);  Surgeon: Urbano Mcgrath MD;  [facility];  Service: UroGynecology           IA CYSTOURETHROSCOPY N/A 10/4/2021    Procedure: CYSTOSCOPY;  Surgeon: Urbano Mcgrath MD;  [facility];  Service: UroGynecology           IA SLING OPERATION STRESS INCONTINENCE N/A 10/4/2021    Procedure: PUBOVAGINAL SLING;  Surgeon: Urbano Mcgrath MD;  [facility];  Service: UroGynecology           REFRACTIVE SURGERY        Family History:     Family History   Problem Relation Age of Onset    No Known Problems Mother     Cervical polyp Father     Prostate cancer Father     Colon cancer Father     Colon cancer Sister 60    No Known Problems Sister     No Known Problems Daughter     No Known Problems Daughter     No Known Problems Maternal Grandmother     No Known Problems Maternal Grandfather     Colon cancer Paternal Grandmother 56    No Known Problems Paternal Grandfather     Kidney cancer Brother     Prostate cancer Brother     Cancer Brother         bladder    Leukemia Maternal Aunt     Lung cancer Maternal Aunt     Brain cancer Maternal Aunt     No Known Problems Maternal Aunt     No Known Problems Paternal Aunt     No Known Problems Paternal Aunt     No Known Problems Paternal Aunt     Ovarian cancer Cousin 50    Cancer Family         Colorectal and polyps      Social History:     Social History     Socioeconomic History    Marital status: /Civil Union     Spouse name: None    Number of children: None    Years of education: None    Highest education level: None   Occupational History    None   Tobacco Use     "Smoking status: Never    Smokeless tobacco: Never   Vaping Use    Vaping status: Never Used   Substance and Sexual Activity    Alcohol use: Yes     Comment: occasional- \"every few months\"    Drug use: No     Comment: Denies    Sexual activity: Yes     Comment: Denies any chest pain or shortness of breath with activity    Other Topics Concern    None   Social History Narrative    None     Social Determinants of Health     Financial Resource Strain: Low Risk  (1/3/2024)    Overall Financial Resource Strain (CARDIA)     Difficulty of Paying Living Expenses: Not very hard   Food Insecurity: Not on file   Transportation Needs: No Transportation Needs (1/3/2024)    PRAPARE - Transportation     Lack of Transportation (Medical): No     Lack of Transportation (Non-Medical): No   Physical Activity: Not on file   Stress: Not on file   Social Connections: Not on file   Intimate Partner Violence: Not on file   Housing Stability: Not on file      Medications and Allergies:     Current Outpatient Medications   Medication Sig Dispense Refill    acetaminophen (TYLENOL) 650 mg CR tablet Take 1 tablet (650 mg total) by mouth every 8 (eight) hours as needed for mild pain or moderate pain 30 tablet 0    diltiazem (CARDIZEM CD) 120 mg 24 hr capsule Take 120 mg by mouth daily      estrogens, conjugated (Premarin) vaginal cream Insert into the vagina      metoprolol succinate (TOPROL-XL) 50 mg 24 hr tablet Take 1 tablet by mouth daily 1 1/2 daily      Multiple Vitamins-Minerals (MULTIVITAMIN ADULT PO) Take 1 tablet by mouth daily 9/26/21 Pt instructed by general instruction sheet from Dr. Mcgrath office.      rosuvastatin (CRESTOR) 5 mg tablet One weekly       No current facility-administered medications for this visit.     Allergies   Allergen Reactions    Codeine Nausea Only, Other (See Comments) and GI Intolerance     Other reaction(s): GI Intolerance  N/V  Other reaction(s): Nausea & vomiting  Other reaction(s): Other (See " Comments)  Other reaction(s): GI Intolerance  N/V  Other reaction(s): Free Text, GI Intolerance, Nausea & vomiting  Other reaction(s): Nausea and Vomiting    Amoxicillin Hives and Swelling     Facial swelling    Latex Itching and Rash     9/27/21 Pt reports latex within tape - leaves rash on skin     Sulfa Antibiotics Swelling and Facial Swelling     Other reaction(s): Rash    Sulfamethoxazole      Other reaction(s): Rash    Sulfamethoxazole-Trimethoprim Other (See Comments)     facial swelling  Other reaction(s): Other (See Comments)  facial swelling    Trimethoprim Rash    Other Itching      Immunizations:     Immunization History   Administered Date(s) Administered    COVID-19 PFIZER VACCINE 0.3 ML IM 02/22/2021, 03/16/2021, 10/18/2021    COVID-19 Pfizer Vac BIVALENT Jase-sucrose 12 Yr+ IM 09/19/2022    INFLUENZA 09/28/2015, 09/22/2016, 09/26/2017    Influenza Split 01/16/2014    Influenza Split High Dose Preservative Free IM 09/22/2016, 09/26/2017    Influenza, high dose seasonal 0.7 mL 10/02/2018, 11/14/2019, 10/13/2020, 11/23/2021, 11/07/2022    Influenza, seasonal, injectable 11/13/2012, 10/27/2014, 09/28/2015    Pneumococcal Conjugate 13-Valent 09/28/2015    Pneumococcal Polysaccharide PPV23 10/27/2014    Tdap 12/31/2015      Health Maintenance:         Topic Date Due    Breast Cancer Screening: Mammogram  01/23/2024    Colorectal Cancer Screening  07/28/2028    Hepatitis C Screening  Completed         Topic Date Due    Influenza Vaccine (1) 09/01/2023    COVID-19 Vaccine (5 - 2023-24 season) 09/01/2023      Medicare Screening Tests and Risk Assessments:     Ying is here for her Subsequent Wellness visit. Last Medicare Wellness visit information reviewed, patient interviewed, no change since last AWV.     Depression Screening:   PHQ-2 Score: 0  PHQ-9 Score: 0      Previous Hospitalizations:   Any hospitalizations or ED visits within the last 12 months?: No      Hospitalization Comments: Eyes / dental OK  "    Cognitive Screening:   Provider or family/friend/caregiver concerned regarding cognition?: No    PREVENTIVE SCREENINGS      Cardiovascular Screening:    General: Screening Not Indicated and History Lipid Disorder      Diabetes Screening:     General: Screening Current      Colorectal Cancer Screening:     General: Screening Current      Breast Cancer Screening:     General: Screening Current      Cervical Cancer Screening:    General: Screening Not Indicated      Osteoporosis Screening:    General: Screening Current      Abdominal Aortic Aneurysm (AAA) Screening:        General: Screening Not Indicated      Lung Cancer Screening:     General: Screening Not Indicated      Hepatitis C Screening:    General: Screening Current    No results found.     Physical Exam:     /70 (BP Location: Left arm, Patient Position: Sitting, Cuff Size: Standard)   Pulse 70   Temp 98.1 °F (36.7 °C) (Temporal)   Resp 18   Ht 5' 1\" (1.549 m)   Wt 59.3 kg (130 lb 12.8 oz)   SpO2 98%   BMI 24.71 kg/m²     Physical Exam     Stephane Kingston MD  "

## 2024-01-03 NOTE — PROGRESS NOTES
Assessment and Plan:     Problem List Items Addressed This Visit       Hx of colonic polyps    Hyperlipidemia    Iron deficiency anemia    Abnormal mammogram    Medicare annual wellness visit, subsequent (Chronic)    Paroxysmal SVT (supraventricular tachycardia)    Subclinical hypothyroidism - Primary (Chronic)    COVID-19        Preventive health issues were discussed with patient, and age appropriate screening tests were ordered as noted in patient's After Visit Summary.  Personalized health advice and appropriate referrals for health education or preventive services given if needed, as noted in patient's After Visit Summary.     History of Present Illness:     Patient presents for a Medicare Wellness Visit    Ear Fullness        Patient Care Team:  Stephane Kingston MD as PCP - General  Stephane Kingston MD as PCP - [facility]CP-[facility] ([facility])     Review of Systems:     Review of Systems     Problem List:     Patient Active Problem List   Diagnosis    Hx of colonic polyps    Hyperlipidemia    Iron deficiency anemia    Abnormal mammogram    Family history of aneurysm    Medicare annual wellness visit, subsequent    Paroxysmal SVT (supraventricular tachycardia)    Incomplete uterovaginal prolapse    Cystocele, midline    Pelvic muscle wasting    Hypermobility of urethra    Subclinical hypothyroidism    COVID-19      Past Medical and Surgical History:     Past Medical History:   Diagnosis Date    Anemia     Bulging of cervical intervertebral disc     Bulging disk neck.    Colon polyp 03/2009    History of anemia     9/27/21 Pt reports hx of anemia - corrected through prescription iron medication     History of narrow angle glaucoma 2012    Irregular heart beat     Takes for arrthymia    SVT (supraventricular tachycardia)     Palpitations      Past Surgical History:   Procedure Laterality Date    BREAST BIOPSY Right 2005    Stereo benign    CARDIAC ELECTROPHYSIOLOGY STUDY AND ABLATION  in 2014    COLONOSCOPY   03/25/2009    HERNIA REPAIR      Inguinal hernia - right side    POLYPECTOMY  03/25/2009    CA CMBND ANTERPOST COLPORRAPHY W/CYSTO N/A 10/4/2021    Procedure: Anterior COLPORRHAPHY;  Surgeon: Urbano Mcgrath MD;  [facility];  Service: UroGynecology           CA COLPOPEXY VAGINAL EXTRAPERITONEAL APPROACH N/A 10/4/2021    Procedure: COLPOSUSPENSION VAGINAL EXTRAPERITONEAL(ENPLACE);  Surgeon: Urbano Mcgrath MD;  [facility];  Service: UroGynecology           CA CYSTOURETHROSCOPY N/A 10/4/2021    Procedure: CYSTOSCOPY;  Surgeon: Urbano Mcgrath MD;  [facility];  Service: UroGynecology           CA SLING OPERATION STRESS INCONTINENCE N/A 10/4/2021    Procedure: PUBOVAGINAL SLING;  Surgeon: Urbano Mcgrath MD;  [facility];  Service: UroGynecology           REFRACTIVE SURGERY        Family History:     Family History   Problem Relation Age of Onset    No Known Problems Mother     Cervical polyp Father     Prostate cancer Father     Colon cancer Father     Colon cancer Sister 60    No Known Problems Sister     No Known Problems Daughter     No Known Problems Daughter     No Known Problems Maternal Grandmother     No Known Problems Maternal Grandfather     Colon cancer Paternal Grandmother 56    No Known Problems Paternal Grandfather     Kidney cancer Brother     Prostate cancer Brother     Cancer Brother         bladder    Leukemia Maternal Aunt     Lung cancer Maternal Aunt     Brain cancer Maternal Aunt     No Known Problems Maternal Aunt     No Known Problems Paternal Aunt     No Known Problems Paternal Aunt     No Known Problems Paternal Aunt     Ovarian cancer Cousin 50    Cancer Family         Colorectal and polyps      Social History:     Social History     Socioeconomic History    Marital status: /Civil Union     Spouse name: None    Number of children: None    Years of education: None    Highest education level: None   Occupational History    None   Tobacco Use     "Smoking status: Never    Smokeless tobacco: Never   Vaping Use    Vaping status: Never Used   Substance and Sexual Activity    Alcohol use: Yes     Comment: occasional- \"every few months\"    Drug use: No     Comment: Denies    Sexual activity: Yes     Comment: Denies any chest pain or shortness of breath with activity    Other Topics Concern    None   Social History Narrative    None     Social Determinants of Health     Financial Resource Strain: Low Risk  (1/3/2024)    Overall Financial Resource Strain (CARDIA)     Difficulty of Paying Living Expenses: Not very hard   Food Insecurity: Not on file   Transportation Needs: No Transportation Needs (1/3/2024)    PRAPARE - Transportation     Lack of Transportation (Medical): No     Lack of Transportation (Non-Medical): No   Physical Activity: Not on file   Stress: Not on file   Social Connections: Not on file   Intimate Partner Violence: Not on file   Housing Stability: Not on file      Medications and Allergies:     Current Outpatient Medications   Medication Sig Dispense Refill    acetaminophen (TYLENOL) 650 mg CR tablet Take 1 tablet (650 mg total) by mouth every 8 (eight) hours as needed for mild pain or moderate pain 30 tablet 0    diltiazem (CARDIZEM CD) 120 mg 24 hr capsule Take 120 mg by mouth daily      estrogens, conjugated (Premarin) vaginal cream Insert into the vagina      metoprolol succinate (TOPROL-XL) 50 mg 24 hr tablet Take 1 tablet by mouth daily 1 1/2 daily      Multiple Vitamins-Minerals (MULTIVITAMIN ADULT PO) Take 1 tablet by mouth daily 9/26/21 Pt instructed by general instruction sheet from Dr. Mcgrath office.      rosuvastatin (CRESTOR) 5 mg tablet One weekly       No current facility-administered medications for this visit.     Allergies   Allergen Reactions    Codeine Nausea Only, Other (See Comments) and GI Intolerance     Other reaction(s): GI Intolerance  N/V  Other reaction(s): Nausea & vomiting  Other reaction(s): Other (See " Comments)  Other reaction(s): GI Intolerance  N/V  Other reaction(s): Free Text, GI Intolerance, Nausea & vomiting  Other reaction(s): Nausea and Vomiting    Amoxicillin Hives and Swelling     Facial swelling    Latex Itching and Rash     9/27/21 Pt reports latex within tape - leaves rash on skin     Sulfa Antibiotics Swelling and Facial Swelling     Other reaction(s): Rash    Sulfamethoxazole      Other reaction(s): Rash    Sulfamethoxazole-Trimethoprim Other (See Comments)     facial swelling  Other reaction(s): Other (See Comments)  facial swelling    Trimethoprim Rash    Other Itching      Immunizations:     Immunization History   Administered Date(s) Administered    COVID-19 PFIZER VACCINE 0.3 ML IM 02/22/2021, 03/16/2021, 10/18/2021    COVID-19 Pfizer Vac BIVALENT Jase-sucrose 12 Yr+ IM 09/19/2022    INFLUENZA 09/28/2015, 09/22/2016, 09/26/2017    Influenza Split 01/16/2014    Influenza Split High Dose Preservative Free IM 09/22/2016, 09/26/2017    Influenza, high dose seasonal 0.7 mL 10/02/2018, 11/14/2019, 10/13/2020, 11/23/2021, 11/07/2022    Influenza, seasonal, injectable 11/13/2012, 10/27/2014, 09/28/2015    Pneumococcal Conjugate 13-Valent 09/28/2015    Pneumococcal Polysaccharide PPV23 10/27/2014    Tdap 12/31/2015      Health Maintenance:         Topic Date Due    Breast Cancer Screening: Mammogram  01/23/2024    Colorectal Cancer Screening  07/28/2028    Hepatitis C Screening  Completed         Topic Date Due    Influenza Vaccine (1) 09/01/2023    COVID-19 Vaccine (5 - 2023-24 season) 09/01/2023      Medicare Screening Tests and Risk Assessments:     Ying is here for her Subsequent Wellness visit. Last Medicare Wellness visit information reviewed, patient interviewed, no change since last AWV.     Depression Screening:   PHQ-2 Score: 0  PHQ-9 Score: 0      Previous Hospitalizations:   Any hospitalizations or ED visits within the last 12 months?: No      Hospitalization Comments: Eyes/ dental OK  "    Advance Care Planning:   Living will: Yes    Durable POA for healthcare: Yes    Advanced directive: Yes      PREVENTIVE SCREENINGS      Cardiovascular Screening:    General: Screening Not Indicated and History Lipid Disorder      Diabetes Screening:     General: Screening Current      Colorectal Cancer Screening:     General: Screening Current      Breast Cancer Screening:     General: Screening Current      Cervical Cancer Screening:    General: Screening Not Indicated      Osteoporosis Screening:    General: Screening Current      Lung Cancer Screening:     General: Screening Not Indicated      Hepatitis C Screening:    General: Screening Current    No results found.     Physical Exam:     /70 (BP Location: Left arm, Patient Position: Sitting, Cuff Size: Standard)   Pulse 70   Temp 98.1 °F (36.7 °C) (Temporal)   Resp 18   Ht 5' 1\" (1.549 m)   Wt 59.3 kg (130 lb 12.8 oz)   SpO2 98%   BMI 24.71 kg/m²     Physical Exam     Stephane Kingston MD  "

## 2024-01-03 NOTE — PROGRESS NOTES
Assessment and Plan:     Problem List Items Addressed This Visit    None       Preventive health issues were discussed with patient, and age appropriate screening tests were ordered as noted in patient's After Visit Summary.  Personalized health advice and appropriate referrals for health education or preventive services given if needed, as noted in patient's After Visit Summary.     History of Present Illness:     Patient presents for a Medicare Wellness Visit    Ear Fullness        Patient Care Team:  Stephane Kingston MD as PCP - General  Stephane Kingston MD as PCP - PCP-[facility] ([facility])     Review of Systems:     Review of Systems     Problem List:     Patient Active Problem List   Diagnosis    Hx of colonic polyps    Hyperlipidemia    Iron deficiency anemia    Abnormal mammogram    Family history of aneurysm    Medicare annual wellness visit, subsequent    Paroxysmal SVT (supraventricular tachycardia)    Incomplete uterovaginal prolapse    Cystocele, midline    Pelvic muscle wasting    Hypermobility of urethra    Subclinical hypothyroidism    COVID-19      Past Medical and Surgical History:     Past Medical History:   Diagnosis Date    Anemia     Bulging of cervical intervertebral disc     Bulging disk neck.    Colon polyp 03/2009    History of anemia     9/27/21 Pt reports hx of anemia - corrected through prescription iron medication     History of narrow angle glaucoma 2012    Irregular heart beat     Takes for arrthymia    SVT (supraventricular tachycardia) (HCC)     Palpitations      Past Surgical History:   Procedure Laterality Date    BREAST BIOPSY Right 2005    Stereo benign    CARDIAC ELECTROPHYSIOLOGY STUDY AND ABLATION  in 2014    COLONOSCOPY  03/25/2009    HERNIA REPAIR      Inguinal hernia - right side    POLYPECTOMY  03/25/2009    FL CMBND ANTERPOST COLPORRAPHY W/CYSTO N/A 10/4/2021    Procedure: Anterior COLPORRHAPHY;  Surgeon: Urbano Mcgrath MD;  Location: [facility];  Service:  "UroGynecology           PA COLPOPEXY VAGINAL EXTRAPERITONEAL APPROACH N/A 10/4/2021    Procedure: COLPOSUSPENSION VAGINAL EXTRAPERITONEAL(ENPLACE);  Surgeon: Urbano Mcgrath MD;  Location[facility];  Service: UroGynecology           PA CYSTOURETHROSCOPY N/A 10/4/2021    Procedure: CYSTOSCOPY;  Surgeon: Urbano Mcgrath MD;  Location[facility];  Service: UroGynecology           PA SLING OPERATION STRESS INCONTINENCE N/A 10/4/2021    Procedure: PUBOVAGINAL SLING;  Surgeon: Urbano Mcgrath MD;  Location[facility];  Service: UroGynecology           REFRACTIVE SURGERY        Family History:     Family History   Problem Relation Age of Onset    No Known Problems Mother     Cervical polyp Father     Prostate cancer Father     Colon cancer Father     Colon cancer Sister 60    No Known Problems Sister     No Known Problems Daughter     No Known Problems Daughter     No Known Problems Maternal Grandmother     No Known Problems Maternal Grandfather     Colon cancer Paternal Grandmother 56    No Known Problems Paternal Grandfather     Kidney cancer Brother     Prostate cancer Brother     Cancer Brother         bladder    Leukemia Maternal Aunt     Lung cancer Maternal Aunt     Brain cancer Maternal Aunt     No Known Problems Maternal Aunt     No Known Problems Paternal Aunt     No Known Problems Paternal Aunt     No Known Problems Paternal Aunt     Ovarian cancer Cousin 50    Cancer Family         Colorectal and polyps      Social History:     Social History     Socioeconomic History    Marital status: /Civil Union     Spouse name: Not on file    Number of children: Not on file    Years of education: Not on file    Highest education level: Not on file   Occupational History    Not on file   Tobacco Use    Smoking status: Never    Smokeless tobacco: Never   Vaping Use    Vaping status: Never Used   Substance and Sexual Activity    Alcohol use: Yes     Comment: occasional- \"every few months\"    Drug use: No     " Comment: Denies    Sexual activity: Yes     Comment: Denies any chest pain or shortness of breath with activity    Other Topics Concern    Not on file   Social History Narrative    Not on file     Social Determinants of Health     Financial Resource Strain: Low Risk  (12/14/2022)    Overall Financial Resource Strain (CARDIA)     Difficulty of Paying Living Expenses: Not hard at all   Food Insecurity: Not on file   Transportation Needs: No Transportation Needs (12/14/2022)    PRAPARE - Transportation     Lack of Transportation (Medical): No     Lack of Transportation (Non-Medical): No   Physical Activity: Not on file   Stress: Not on file   Social Connections: Not on file   Intimate Partner Violence: Not on file   Housing Stability: Not on file      Medications and Allergies:     Current Outpatient Medications   Medication Sig Dispense Refill    acetaminophen (TYLENOL) 650 mg CR tablet Take 1 tablet (650 mg total) by mouth every 8 (eight) hours as needed for mild pain or moderate pain 30 tablet 0    diltiazem (CARDIZEM CD) 120 mg 24 hr capsule Take 120 mg by mouth daily      estrogens, conjugated (Premarin) vaginal cream Insert into the vagina      metoprolol succinate (TOPROL-XL) 50 mg 24 hr tablet Take 1 tablet by mouth daily 1 1/2 daily      Multiple Vitamins-Minerals (MULTIVITAMIN ADULT PO) Take 1 tablet by mouth daily 9/26/21 Pt instructed by general instruction sheet from Dr. Mcgrath office.      rosuvastatin (CRESTOR) 5 mg tablet One weekly      fluocinonide (LIDEX) 0.05 % external solution APPLY TO AFFECTED AREA TWICE A DAY (Patient not taking: Reported on 1/3/2024) 60 mL 2     No current facility-administered medications for this visit.     Allergies   Allergen Reactions    Codeine Nausea Only, Other (See Comments) and GI Intolerance     Other reaction(s): GI Intolerance  N/V  Other reaction(s): Nausea & vomiting  Other reaction(s): Other (See Comments)  Other reaction(s): GI Intolerance  N/V  Other  reaction(s): Free Text, GI Intolerance, Nausea & vomiting  Other reaction(s): Nausea and Vomiting    Amoxicillin Hives and Swelling     Facial swelling    Latex Itching and Rash     9/27/21 Pt reports latex within tape - leaves rash on skin     Sulfa Antibiotics Swelling and Facial Swelling     Other reaction(s): Rash    Sulfamethoxazole      Other reaction(s): Rash    Sulfamethoxazole-Trimethoprim Other (See Comments)     facial swelling  Other reaction(s): Other (See Comments)  facial swelling    Trimethoprim Rash    Other Itching      Immunizations:     Immunization History   Administered Date(s) Administered    COVID-19 PFIZER VACCINE 0.3 ML IM 02/22/2021, 03/16/2021, 10/18/2021    COVID-19 Pfizer Vac BIVALENT Jase-sucrose 12 Yr+ IM 09/19/2022    INFLUENZA 09/28/2015, 09/22/2016, 09/26/2017    Influenza Split 01/16/2014    Influenza Split High Dose Preservative Free IM 09/22/2016, 09/26/2017    Influenza, high dose seasonal 0.7 mL 10/02/2018, 11/14/2019, 10/13/2020, 11/23/2021, 11/07/2022    Influenza, seasonal, injectable 11/13/2012, 10/27/2014, 09/28/2015    Pneumococcal Conjugate 13-Valent 09/28/2015    Pneumococcal Polysaccharide PPV23 10/27/2014    Tdap 12/31/2015      Health Maintenance:         Topic Date Due    Breast Cancer Screening: Mammogram  01/23/2024    Colorectal Cancer Screening  07/28/2028    Hepatitis C Screening  Completed         Topic Date Due    Influenza Vaccine (1) 09/01/2023    COVID-19 Vaccine (5 - 2023-24 season) 09/01/2023      Medicare Screening Tests and Risk Assessments:     Ying is here for her Subsequent Wellness visit.     Health Risk Assessment:   Patient rates overall health as good. Patient feels that their physical health rating is same. Patient is satisfied with their life. Eyesight was rated as slightly worse. Hearing was rated as slightly worse. Patient feels that their emotional and mental health rating is same. Patients states they are sometimes angry. Patient  states they are sometimes unusually tired/fatigued. Pain experienced in the last 7 days has been some. Patient's pain rating has been 3/10. Patient states that she has experienced no weight loss or gain in last 6 months.     Depression Screening:   PHQ-2 Score: 0  PHQ-9 Score: 0      Fall Risk Screening:   In the past year, patient has experienced: history of falling in past year    Number of falls: 1  Injured during fall?: No    Feels unsteady when standing or walking?: Yes    Worried about falling?: Yes      Urinary Incontinence Screening:   Patient has not leaked urine accidently in the last six months.     Home Safety:  Patient does not have trouble with stairs inside or outside of their home. Patient has working smoke alarms and has no working carbon monoxide detector. Home safety hazards include: none.     Nutrition:   Current diet is Regular.     Medications:   Patient is currently taking over-the-counter supplements. OTC medications include: see medication list. Patient is able to manage medications.     Activities of Daily Living (ADLs)/Instrumental Activities of Daily Living (IADLs):   Walk and transfer into and out of bed and chair?: Yes  Dress and groom yourself?: Yes    Bathe or shower yourself?: Yes    Feed yourself? Yes  Do your laundry/housekeeping?: Yes  Manage your money, pay your bills and track your expenses?: Yes  Make your own meals?: Yes    Do your own shopping?: Yes    Previous Hospitalizations:   Any hospitalizations or ED visits within the last 12 months?: No      Advance Care Planning:   Living will: Yes    Advanced directive: Yes    Advanced directive counseling given: Yes      PREVENTIVE SCREENINGS      Cardiovascular Screening:    General: Screening Not Indicated and History Lipid Disorder      Colorectal Cancer Screening:     General: Screening Current      Breast Cancer Screening:     General: Screening Current      Cervical Cancer Screening:    General: Screening Not Indicated       Lung Cancer Screening:     General: Screening Not Indicated      Hepatitis C Screening:    General: Screening Current    No results found.     Physical Exam:     Wt 59.3 kg (130 lb 12.8 oz)   BMI 24.63 kg/m²     Physical Exam     Stephane Kingston MD

## 2024-01-03 NOTE — PROGRESS NOTES
"Assessment/Plan:    No problem-specific Assessment & Plan notes found for this encounter.             Subjective:      Patient ID: Ying Rodas is a 73 y.o. female.      PATIENT RETURNS FOR FOLLOW-UP OF CHRONIC MEDICAL CONDITIONS.  ANY HOSPITAL VISITS, EMERGENCY VISITS AND OTHER PROVIDER VISITS SINCE LAST TIME WERE REVIEWED.  MEDS WERE REVIEWED AND NO SIDE EFFECTS.  NO NEW ISSUES  UNLESS NOTED BELOW. NO NEW MEDICAL PROVIDER REPORTED. THE CHRONIC DISEASES LISTED ABOVE ARE STABLE AND UNCHANGED/ THE PLAN OF CARE FOR THOSE WILL REMAIN UNCHANGED UNLESS NOTED BELOW.      Awv/ sub     Ear Fullness   Pertinent negatives include no abdominal pain, diarrhea or rash.       The following portions of the patient's history were reviewed and updated as appropriate: allergies, current medications, past family history, past medical history, past social history, past surgical history and problem list.    Review of Systems   Constitutional:  Negative for activity change and appetite change.   HENT:  Negative for voice change.    Eyes:  Negative for visual disturbance.   Respiratory:  Negative for chest tightness and shortness of breath.    Cardiovascular:  Negative for chest pain, palpitations and leg swelling.   Gastrointestinal:  Negative for abdominal pain, blood in stool, constipation and diarrhea.   Genitourinary:  Negative for dysuria, vaginal bleeding and vaginal discharge.   Skin:  Negative for rash.   Neurological:  Negative for dizziness.   Psychiatric/Behavioral:  Negative for dysphoric mood.          Objective:  Vitals:    01/03/24 0926   BP: 120/70   BP Location: Left arm   Patient Position: Sitting   Cuff Size: Standard   Pulse: 70   Resp: 18   Temp: 98.1 °F (36.7 °C)   TempSrc: Temporal   SpO2: 98%   Weight: 59.3 kg (130 lb 12.8 oz)   Height: 5' 1\" (1.549 m)      Physical Exam  Constitutional:       Appearance: Normal appearance.   HENT:      Head: Normocephalic and atraumatic.      Right Ear: Tympanic membrane and " ear canal normal.      Left Ear: Tympanic membrane and ear canal normal.      Mouth/Throat:      Mouth: Mucous membranes are moist.      Pharynx: Oropharynx is clear.   Eyes:      Conjunctiva/sclera: Conjunctivae normal.   Cardiovascular:      Rate and Rhythm: Normal rate and regular rhythm.      Pulses: Normal pulses.      Heart sounds: Normal heart sounds. No murmur heard.  Pulmonary:      Effort: Pulmonary effort is normal. No respiratory distress.      Breath sounds: Normal breath sounds.   Musculoskeletal:      Cervical back: Neck supple.   Lymphadenopathy:      Cervical: No cervical adenopathy.   Neurological:      Mental Status: She is alert. Mental status is at baseline.   Psychiatric:         Mood and Affect: Mood normal.         Thought Content: Thought content normal.           Patient's chronic problems that were reviewed today are stable. Recent hospital stays reviewed. Recent labs and imaging reviewed. Recent visits to other providers reviewed. Meds reviewed and no changes made. Appropriate labs and imaging were ordered. Preventive measures appropriate for age and sex were reviewed with patient. Immunizations were updated as appropriate.

## 2024-01-24 ENCOUNTER — HOSPITAL ENCOUNTER (OUTPATIENT)
Dept: RADIOLOGY | Age: 74
Discharge: HOME | End: 2024-01-24
Payer: COMMERCIAL

## 2024-01-24 VITALS — BODY MASS INDEX: 24.17 KG/M2 | WEIGHT: 128 LBS | HEIGHT: 61 IN

## 2024-01-24 DIAGNOSIS — Z12.31 VISIT FOR SCREENING MAMMOGRAM: ICD-10-CM

## 2024-01-24 PROCEDURE — 77063 BREAST TOMOSYNTHESIS BI: CPT

## 2024-01-24 PROCEDURE — 77067 SCR MAMMO BI INCL CAD: CPT

## 2024-02-05 ENCOUNTER — HOSPITAL ENCOUNTER (OUTPATIENT)
Dept: ULTRASOUND IMAGING | Facility: CLINIC | Age: 74
Discharge: HOME | End: 2024-02-05
Payer: COMMERCIAL

## 2024-02-05 DIAGNOSIS — R92.8 ABNORMAL MAMMOGRAM: ICD-10-CM

## 2024-02-05 PROCEDURE — 76642 ULTRASOUND BREAST LIMITED: CPT

## 2024-03-28 LAB — 25(OH)D3 SERPL-MCNC: 35 NG/ML (ref 30–100)

## 2024-04-11 ENCOUNTER — HOSPITAL ENCOUNTER (OUTPATIENT)
Dept: RADIOLOGY | Facility: HOSPITAL | Age: 74
Discharge: HOME | End: 2024-04-11
Attending: OPHTHALMOLOGY
Payer: COMMERCIAL

## 2024-04-11 DIAGNOSIS — H53.452 OTHER LOCALIZED VISUAL FIELD DEFECT, LEFT EYE: ICD-10-CM

## 2024-04-11 PROCEDURE — 70553 MRI BRAIN STEM W/O & W/DYE: CPT

## 2024-04-11 PROCEDURE — A9585 GADOBUTROL INJECTION: HCPCS | Performed by: OPHTHALMOLOGY

## 2024-04-11 RX ORDER — GADOBUTROL 604.72 MG/ML
5 INJECTION INTRAVENOUS
Status: COMPLETED | OUTPATIENT
Start: 2024-04-11 | End: 2024-04-11

## 2024-04-11 RX ADMIN — GADOBUTROL 5 ML: 604.72 INJECTION INTRAVENOUS at 09:55

## 2024-06-11 ENCOUNTER — TELEPHONE (OUTPATIENT)
Age: 74
End: 2024-06-11

## 2024-06-11 NOTE — TELEPHONE ENCOUNTER
Pt called in she received an letter stating Dr. Kingston will be retiring soon. And she would want Dr. Gomes to be her PCP so kindly update the chart with Dr. Gomes as her PCP so they can make appt with him in near future if need to be seen. Thanks

## 2024-06-18 ENCOUNTER — OFFICE VISIT (OUTPATIENT)
Dept: FAMILY MEDICINE CLINIC | Facility: CLINIC | Age: 74
End: 2024-06-18

## 2024-06-18 VITALS
TEMPERATURE: 97.2 F | OXYGEN SATURATION: 97 % | DIASTOLIC BLOOD PRESSURE: 70 MMHG | WEIGHT: 131.8 LBS | HEART RATE: 69 BPM | HEIGHT: 61 IN | SYSTOLIC BLOOD PRESSURE: 118 MMHG | BODY MASS INDEX: 24.88 KG/M2

## 2024-06-18 DIAGNOSIS — R30.0 DIFFICULT OR PAINFUL URINATION: Primary | ICD-10-CM

## 2024-06-18 LAB
AMORPH URATE CRY URNS QL MICRO: ABNORMAL
BACTERIA UR QL AUTO: ABNORMAL /HPF
BILIRUB UR QL STRIP: NEGATIVE
CLARITY UR: ABNORMAL
COLOR UR: ABNORMAL
GLUCOSE UR STRIP-MCNC: NEGATIVE MG/DL
HGB UR QL STRIP.AUTO: NEGATIVE
KETONES UR STRIP-MCNC: NEGATIVE MG/DL
LEUKOCYTE ESTERASE UR QL STRIP: ABNORMAL
NITRITE UR QL STRIP: POSITIVE
NON-SQ EPI CELLS URNS QL MICRO: ABNORMAL /HPF
PH UR STRIP.AUTO: 7 [PH]
PROT UR STRIP-MCNC: NEGATIVE MG/DL
RBC #/AREA URNS AUTO: ABNORMAL /HPF
SL AMB  POCT GLUCOSE, UA: NEGATIVE
SL AMB LEUKOCYTE ESTERASE,UA: 125
SL AMB POCT BILIRUBIN,UA: NEGATIVE
SL AMB POCT BLOOD,UA: POSITIVE
SL AMB POCT CLARITY,UA: ABNORMAL
SL AMB POCT COLOR,UA: ABNORMAL
SL AMB POCT KETONES,UA: NEGATIVE
SL AMB POCT NITRITE,UA: POSITIVE
SL AMB POCT PH,UA: 6.5
SL AMB POCT SPECIFIC GRAVITY,UA: 5
SL AMB POCT URINE PROTEIN: NEGATIVE
SL AMB POCT UROBILINOGEN: NEGATIVE
SP GR UR STRIP.AUTO: 1.01 (ref 1–1.03)
UROBILINOGEN UR STRIP-ACNC: <2 MG/DL
WBC #/AREA URNS AUTO: ABNORMAL /HPF

## 2024-06-18 PROCEDURE — 81001 URINALYSIS AUTO W/SCOPE: CPT

## 2024-06-18 RX ORDER — NITROFURANTOIN 25; 75 MG/1; MG/1
100 CAPSULE ORAL 2 TIMES DAILY
Qty: 14 CAPSULE | Refills: 0 | Status: SHIPPED | OUTPATIENT
Start: 2024-06-18 | End: 2024-06-25

## 2024-06-18 NOTE — PROGRESS NOTES
Ambulatory Visit  Name: Ying Rodas      : 1950      MRN: [id number]  Encounter Provider: YENIFER Bella  Encounter Date: 2024   Encounter department: [facility]    Assessment & Plan   1. Difficult or painful urination  -     POCT urine dip  -     UA (URINE) with reflex to Scope  -     nitrofurantoin (MACROBID) 100 mg capsule; Take 1 capsule (100 mg total) by mouth 2 (two) times a day for 7 days         History of Present Illness     Urinary Tract Infection   This is a new problem. The current episode started in the past 7 days. The problem occurs every urination. The problem has been gradually worsening. The quality of the pain is described as aching and burning. The pain is moderate. There has been no fever. She is Not sexually active. There is No history of pyelonephritis. Associated symptoms include frequency, hesitancy and urgency. Pertinent negatives include no chills, discharge, flank pain, hematuria, nausea, possible pregnancy, sweats or vomiting. Treatments tried: pyridium. The treatment provided mild relief. Her past medical history is significant for a urological procedure. There is no history of catheterization, kidney stones, a single kidney or urinary stasis. COLPOSUSPENSION VAGINAL EXTRAPERITONEAL(ENPLACE) (Vagina )      Anterior COLPORRHAPHY (Perineum)      PUBOVAGINAL SLING (Vagina )      CYSTOSCOPY (Bladder)     Review of Systems   Constitutional:  Negative for activity change, chills, fatigue and fever.   HENT:  Negative for congestion, ear pain, rhinorrhea, sore throat and trouble swallowing.    Eyes:  Negative for pain and visual disturbance.   Respiratory:  Negative for cough, chest tightness and shortness of breath.    Cardiovascular:  Negative for chest pain, palpitations and leg swelling.   Gastrointestinal:  Negative for abdominal pain, constipation, diarrhea, nausea and vomiting.   Genitourinary:  Positive for frequency, hesitancy and  urgency. Negative for difficulty urinating, dysuria, flank pain and hematuria.   Musculoskeletal:  Negative for arthralgias and back pain.   Skin:  Negative for color change and rash.   Neurological:  Negative for dizziness, seizures, syncope and headaches.   Psychiatric/Behavioral:  Negative for dysphoric mood. The patient is not nervous/anxious.    All other systems reviewed and are negative.    Past Medical History:   Diagnosis Date    Anemia     Bulging of cervical intervertebral disc     Bulging disk neck.    Colon polyp 03/2009    History of anemia     9/27/21 Pt reports hx of anemia - corrected through prescription iron medication     History of narrow angle glaucoma 2012    Irregular heart beat     Takes for arrthymia    SVT (supraventricular tachycardia)     Palpitations      Past Surgical History:   Procedure Laterality Date    BREAST BIOPSY Right 2005    Stereo benign    CARDIAC ELECTROPHYSIOLOGY STUDY AND ABLATION  in 2014    COLONOSCOPY  03/25/2009    HERNIA REPAIR      Inguinal hernia - right side    POLYPECTOMY  03/25/2009    DC CMBND ANTERPOST COLPORRAPHY W/CYSTO N/A 10/4/2021    Procedure: Anterior COLPORRHAPHY;  Surgeon: Urbano Mcgrath MD;  Location: [facility];  Service: UroGynecology           DC COLPOPEXY VAGINAL EXTRAPERITONEAL APPROACH N/A 10/4/2021    Procedure: COLPOSUSPENSION VAGINAL EXTRAPERITONEAL(ENPLACE);  Surgeon: Urbano Mcgrath MD;  Location: [facility];  Service: UroGynecology           DC CYSTOURETHROSCOPY N/A 10/4/2021    Procedure: CYSTOSCOPY;  Surgeon: Urbano Mcgrath MD;  Location: [facility];  Service: UroGynecology           DC SLING OPERATION STRESS INCONTINENCE N/A 10/4/2021    Procedure: PUBOVAGINAL SLING;  Surgeon: Urbano Mcgrath MD;  Location: [facility];  Service: UroGynecology           REFRACTIVE SURGERY       Family History   Problem Relation Age of Onset    No Known Problems Mother     Cervical polyp Father     Prostate cancer Father 80    Colon cancer Father  "80    Colon cancer Sister 60    No Known Problems Sister     No Known Problems Daughter     No Known Problems Daughter     No Known Problems Maternal Grandmother     No Known Problems Maternal Grandfather     Colon cancer Paternal Grandmother 56    No Known Problems Paternal Grandfather     Kidney cancer Brother 60    Prostate cancer Brother 60    Cancer Brother 67        bladder    Leukemia Maternal Aunt     Lung cancer Maternal Aunt 49    Brain cancer Maternal Aunt     No Known Problems Maternal Aunt     No Known Problems Paternal Aunt     No Known Problems Paternal Aunt     No Known Problems Paternal Aunt     Ovarian cancer Cousin 50    Cancer Family         Colorectal and polyps     Social History     Tobacco Use    Smoking status: Never    Smokeless tobacco: Never   Vaping Use    Vaping status: Never Used   Substance and Sexual Activity    Alcohol use: Yes     Comment: occasional- \"every few months\"    Drug use: No     Comment: Denies    Sexual activity: Yes     Comment: Denies any chest pain or shortness of breath with activity      Current Outpatient Medications on File Prior to Visit   Medication Sig    acetaminophen (TYLENOL) 650 mg CR tablet Take 1 tablet (650 mg total) by mouth every 8 (eight) hours as needed for mild pain or moderate pain    diltiazem (CARDIZEM CD) 120 mg 24 hr capsule Take 120 mg by mouth daily    estrogens, conjugated (Premarin) vaginal cream Insert into the vagina    metoprolol succinate (TOPROL-XL) 50 mg 24 hr tablet Take 1 tablet by mouth daily 1 1/2 daily    Multiple Vitamins-Minerals (MULTIVITAMIN ADULT PO) Take 1 tablet by mouth daily 9/26/21 Pt instructed by general instruction sheet from Dr. Mcgrath office.    rosuvastatin (CRESTOR) 5 mg tablet One weekly     Allergies   Allergen Reactions    Codeine Nausea Only, Other (See Comments) and GI Intolerance     Other reaction(s): GI Intolerance  N/V  Other reaction(s): Nausea & vomiting  Other reaction(s): Other (See " "Comments)  Other reaction(s): GI Intolerance  N/V  Other reaction(s): Free Text, GI Intolerance, Nausea & vomiting  Other reaction(s): Nausea and Vomiting    Amoxicillin Hives and Swelling     Facial swelling    Latex Itching and Rash     9/27/21 Pt reports latex within tape - leaves rash on skin     Sulfa Antibiotics Swelling and Facial Swelling     Other reaction(s): Rash    Sulfamethoxazole      Other reaction(s): Rash    Sulfamethoxazole-Trimethoprim Other (See Comments)     facial swelling  Other reaction(s): Other (See Comments)  facial swelling    Trimethoprim Rash    Other Itching     Immunization History   Administered Date(s) Administered    COVID-19 PFIZER VACCINE 0.3 ML IM 02/22/2021, 03/16/2021, 10/18/2021    COVID-19 Pfizer Vac BIVALENT Jase-sucrose 12 Yr+ IM 09/19/2022    INFLUENZA 09/28/2015, 09/22/2016, 09/26/2017    Influenza Split 01/16/2014    Influenza Split High Dose Preservative Free IM 09/22/2016, 09/26/2017    Influenza, high dose seasonal 0.7 mL 10/02/2018, 11/14/2019, 10/13/2020, 11/23/2021, 11/07/2022, 01/03/2024    Influenza, seasonal, injectable 11/13/2012, 10/27/2014, 09/28/2015    Pneumococcal Conjugate 13-Valent 09/28/2015    Pneumococcal Polysaccharide PPV23 10/27/2014    Tdap 12/31/2015     Objective     /70 (BP Location: Left arm, Patient Position: Sitting, Cuff Size: Adult)   Pulse 69   Temp (!) 97.2 °F (36.2 °C)   Ht 5' 1\" (1.549 m)   Wt 59.8 kg (131 lb 12.8 oz)   SpO2 97%   BMI 24.90 kg/m²     Physical Exam  Vitals and nursing note reviewed.   Constitutional:       General: She is not in acute distress.     Appearance: Normal appearance. She is well-developed and normal weight.   HENT:      Head: Normocephalic and atraumatic.      Right Ear: Tympanic membrane, ear canal and external ear normal. There is no impacted cerumen.      Left Ear: Tympanic membrane, ear canal and external ear normal. There is no impacted cerumen.      Nose: Nose normal.      Mouth/Throat:    "   Mouth: Mucous membranes are moist.      Pharynx: Oropharynx is clear.   Eyes:      Extraocular Movements: Extraocular movements intact.      Conjunctiva/sclera: Conjunctivae normal.      Pupils: Pupils are equal, round, and reactive to light.   Cardiovascular:      Rate and Rhythm: Normal rate and regular rhythm.      Pulses: Normal pulses.      Heart sounds: Normal heart sounds. No murmur heard.  Pulmonary:      Effort: Pulmonary effort is normal. No respiratory distress.      Breath sounds: Normal breath sounds.   Abdominal:      General: Bowel sounds are normal.      Palpations: Abdomen is soft.      Tenderness: There is no abdominal tenderness.   Musculoskeletal:         General: No swelling. Normal range of motion.      Cervical back: Normal range of motion and neck supple.      Right lower leg: No edema.      Left lower leg: No edema.   Skin:     General: Skin is warm and dry.      Capillary Refill: Capillary refill takes less than 2 seconds.   Neurological:      General: No focal deficit present.      Mental Status: She is alert and oriented to person, place, and time. Mental status is at baseline.   Psychiatric:         Mood and Affect: Mood normal.         Behavior: Behavior normal.         Thought Content: Thought content normal.         Judgment: Judgment normal.       Administrative Statements       Patient Instructions   Nitrofurantoin Combination (By mouth)   Nitrofurantoin Monohydrate (qbe-fpfd-wmur-AN-toyn ita-ww-AOQ-drate), Nitrofurantoin, Macrocrystals (dwt-fnyr-gjnq-AN-toyn LOX-ske-bnug-tals)  Treats urinary tract infections caused by bacteria. This medicine is an antibiotic.   Brand Name(s): Macrobid, Nitrofurantoin Susan   There may be other brand names for this medicine.  When This Medicine Should Not Be Used:   You should not use this medicine if you have had an allergic reaction to nitrofurantoin or if you are in your last weeks of pregnancy (week 38 or later). You should not use this  medicine if you have severe kidney disease, if you are unable to urinate, or if you have a decreased amount of urine. Do not use this medicine if you have a history of liver disease with nitrofurantoin.  How to Use This Medicine:   Capsule  Your doctor will tell you how much medicine to use. Do not use more than directed.  It is best to take this medicine with food or milk.  Take all of the medicine in your prescription to clear up your infection, even if you feel better after the first few doses.  If a dose is missed:   Take a dose as soon as you remember. If it is almost time for your next dose, wait until then and take a regular dose. Do not take extra medicine to make up for a missed dose.  How to Store and Dispose of This Medicine:   Store the medicine in a closed container at room temperature, away from heat, moisture, and direct light.  Ask your pharmacist, doctor, or health caregiver about the best way to dispose of any outdated medicine or medicine no longer needed.  Keep all medicine out of the reach of children. Never share your medicine with anyone.  Drugs and Foods to Avoid:   Ask your doctor or pharmacist before using any other medicine, including over-the-counter medicines, vitamins, and herbal products.  Make sure your doctor knows if you are also using probenecid (Benemid®) or sulfinpyrazone (Anturane®).  It is best not to use antacids containing magnesium trisilicate (such as Foamicon® or Gaviscon®) while you are using nitrofurantoin.  Warnings While Using This Medicine:   Make sure your doctor knows if you are pregnant or breastfeeding, or if you have liver disease, heart disease, lung disease, anemia, diabetes, a mineral imbalance in the blood, or vitamin B deficiency. Make sure your doctor knows if you have a condition called [id number].  This medicine may cause your urine to be a brown color. This is normal and will not affect how the medicine works.  This medicine can cause diarrhea. Call  your doctor if the diarrhea becomes severe, does not stop, or is bloody. Do not take any medicine to stop diarrhea until you have talked to your doctor. Diarrhea can occur 2 months or more after you stop taking this medicine.  Use this medicine only to treat the infection you now have. This medicine will not work for colds, flu, or other virus infections.  Possible Side Effects While Using This Medicine:   Call your doctor right away if you notice any of these side effects:  Allergic reaction: Itching or hives, swelling in your face or hands, swelling or tingling in your mouth or throat, chest tightness, trouble breathing  Blisters, peeling, or red skin rash.  Cough, fever, chills, weakness, shortness of breath, or chest pain.  Dark-colored urine or pale stools.  Diarrhea or loose, watery stools that may contain blood.  Nausea, vomiting, loss of appetite, or pain in your upper stomach.  Numbness, tingling, or burning pain in your hands, arms, legs, or feet.  Yellowing of your skin or the whites of your eyes.  If you notice these less serious side effects, talk with your doctor:   Dizziness, headache, or blurred vision.  Hair loss.  Mild nausea, vomiting, constipation, stomach upset, or pain.  Vaginal itching or fluids.  If you notice other side effects that you think are caused by this medicine, tell your doctor.   Call your doctor for medical advice about side effects. You may report side effects to FDA at 7-523-FDA-8452  © Copyright [employer] 2023 Information is for End User's use only and may not be sold, redistributed or otherwise used for commercial purposes.  The above information is an  only. It is not intended as medical advice for individual conditions or treatments. Talk to your doctor, nurse or pharmacist before following any medical regimen to see if it is safe and effective for you.  Urinary Tract Infection in Older Adults   WHAT YOU NEED TO KNOW:   A urinary tract infection (UTI) is  caused by bacteria that get inside your urinary tract. Your urinary tract includes your kidneys, ureters, bladder, and urethra. A UTI is more common in your lower urinary tract, which includes your bladder and urethra.       DISCHARGE INSTRUCTIONS:   Return to the emergency department if:   You become confused or agitated.    You fall down.    You are urinating very little or not at all.    You are vomiting.    You have a high fever with shaking chills.     You have side or back pain that gets worse.    Call your doctor if:   You have a fever.    You are a woman and you have increased white or yellow discharge from your vagina.    You do not feel better after 2 days of taking antibiotics.    You have questions or concerns about your condition or care.    Medicines:   Medicines  help treat the bacterial infection or decrease pain and burning when you urinate. You may also need medicines to decrease the urge to urinate often. If you have UTIs often (called recurrent UTIs), you may be given antibiotics to take regularly. You will be given directions for when and how to use antibiotics. The goal is to prevent UTIs but not cause antibiotic resistance by using antibiotics too often.    Take your medicine as directed.  Contact your healthcare provider if you think your medicine is not helping or if you have side effects. Tell your provider if you are allergic to any medicine. Keep a list of the medicines, vitamins, and herbs you take. Include the amounts, and when and why you take them. Bring the list or the pill bottles to follow-up visits. Carry your medicine list with you in case of an emergency.    Self-care:   Drink liquids as directed.  Liquids can help flush bacteria from your urinary tract. Ask how much liquid to drink each day and which liquids are best for you. You may need to drink more liquids than usual to help flush out the bacteria. Do not drink alcohol, caffeine, and citrus juices. These can irritate your  bladder and increase your symptoms.    Apply heat  on your abdomen for 20 to 30 minutes every 2 hours for as many days as directed. Heat helps decrease discomfort and pressure in your bladder.    Prevent a UTI:   Urinate when you feel the urge.  Do not hold your urine. Bacteria can grow if urine stays in the bladder too long. It may be helpful to urinate at least every 3 to 4 hours.    Urinate after you have sex.  This will help flush away bacteria that can enter your urinary tract during sex.    Wear cotton underwear and clothes that are loose.  Tight pants and nylon underwear can trap moisture and cause bacteria to grow.    Drink cranberry juice or take cranberry supplements.  These may help prevent UTIs. Your healthcare provider can recommend the right juice or supplement for you.    Women should wipe front to back after urinating or having a bowel movement.  This may prevent germs from getting into the urinary tract. Do not douche or use feminine deodorants. These can change the chemical balance in your vagina. You may also be given vaginal estrogen medicine. This medicine helps prevent recurrent UTIs in women who have gone through menopause or are in nida-menopause.    Follow up with your doctor as directed:  Write down your questions so you remember to ask them during your visits.  © Copyright [employer] 2023 Information is for End User's use only and may not be sold, redistributed or otherwise used for commercial purposes.  The above information is an  only. It is not intended as medical advice for individual conditions or treatments. Talk to your doctor, nurse or pharmacist before following any medical regimen to see if it is safe and effective for you.

## 2024-06-18 NOTE — PATIENT INSTRUCTIONS
Nitrofurantoin Combination (By mouth)   Nitrofurantoin Monohydrate (bzh-kuxe-svqa-AN-toyn vju-wr-OMC-drate), Nitrofurantoin, Macrocrystals (onl-psnd-iyuc-AN-toyn LNI-uxk-ezjn-tals)  Treats urinary tract infections caused by bacteria. This medicine is an antibiotic.   Brand Name(s): Macrobid, Nitrofurantoin Avpak   There may be other brand names for this medicine.  When This Medicine Should Not Be Used:   You should not use this medicine if you have had an allergic reaction to nitrofurantoin or if you are in your last weeks of pregnancy (week 38 or later). You should not use this medicine if you have severe kidney disease, if you are unable to urinate, or if you have a decreased amount of urine. Do not use this medicine if you have a history of liver disease with nitrofurantoin.  How to Use This Medicine:   Capsule  Your doctor will tell you how much medicine to use. Do not use more than directed.  It is best to take this medicine with food or milk.  Take all of the medicine in your prescription to clear up your infection, even if you feel better after the first few doses.  If a dose is missed:   Take a dose as soon as you remember. If it is almost time for your next dose, wait until then and take a regular dose. Do not take extra medicine to make up for a missed dose.  How to Store and Dispose of This Medicine:   Store the medicine in a closed container at room temperature, away from heat, moisture, and direct light.  Ask your pharmacist, doctor, or health caregiver about the best way to dispose of any outdated medicine or medicine no longer needed.  Keep all medicine out of the reach of children. Never share your medicine with anyone.  Drugs and Foods to Avoid:   Ask your doctor or pharmacist before using any other medicine, including over-the-counter medicines, vitamins, and herbal products.  Make sure your doctor knows if you are also using probenecid (Benemid®) or sulfinpyrazone (Anturane®).  It is best not to  use antacids containing magnesium trisilicate (such as Foamicon® or Gaviscon®) while you are using nitrofurantoin.  Warnings While Using This Medicine:   Make sure your doctor knows if you are pregnant or breastfeeding, or if you have liver disease, heart disease, lung disease, anemia, diabetes, a mineral imbalance in the blood, or vitamin B deficiency. Make sure your doctor knows if you have a condition called [id number].  This medicine may cause your urine to be a brown color. This is normal and will not affect how the medicine works.  This medicine can cause diarrhea. Call your doctor if the diarrhea becomes severe, does not stop, or is bloody. Do not take any medicine to stop diarrhea until you have talked to your doctor. Diarrhea can occur 2 months or more after you stop taking this medicine.  Use this medicine only to treat the infection you now have. This medicine will not work for colds, flu, or other virus infections.  Possible Side Effects While Using This Medicine:   Call your doctor right away if you notice any of these side effects:  Allergic reaction: Itching or hives, swelling in your face or hands, swelling or tingling in your mouth or throat, chest tightness, trouble breathing  Blisters, peeling, or red skin rash.  Cough, fever, chills, weakness, shortness of breath, or chest pain.  Dark-colored urine or pale stools.  Diarrhea or loose, watery stools that may contain blood.  Nausea, vomiting, loss of appetite, or pain in your upper stomach.  Numbness, tingling, or burning pain in your hands, arms, legs, or feet.  Yellowing of your skin or the whites of your eyes.  If you notice these less serious side effects, talk with your doctor:   Dizziness, headache, or blurred vision.  Hair loss.  Mild nausea, vomiting, constipation, stomach upset, or pain.  Vaginal itching or fluids.  If you notice other side effects that you think are caused by this medicine, tell your doctor.   Call your doctor for  medical advice about side effects. You may report side effects to FDA at 4-353-FDA-4724  © Copyright [employer]ative 2023 Information is for End User's use only and may not be sold, redistributed or otherwise used for commercial purposes.  The above information is an  only. It is not intended as medical advice for individual conditions or treatments. Talk to your doctor, nurse or pharmacist before following any medical regimen to see if it is safe and effective for you.  Urinary Tract Infection in Older Adults   WHAT YOU NEED TO KNOW:   A urinary tract infection (UTI) is caused by bacteria that get inside your urinary tract. Your urinary tract includes your kidneys, ureters, bladder, and urethra. A UTI is more common in your lower urinary tract, which includes your bladder and urethra.       DISCHARGE INSTRUCTIONS:   Return to the emergency department if:   You become confused or agitated.    You fall down.    You are urinating very little or not at all.    You are vomiting.    You have a high fever with shaking chills.     You have side or back pain that gets worse.    Call your doctor if:   You have a fever.    You are a woman and you have increased white or yellow discharge from your vagina.    You do not feel better after 2 days of taking antibiotics.    You have questions or concerns about your condition or care.    Medicines:   Medicines  help treat the bacterial infection or decrease pain and burning when you urinate. You may also need medicines to decrease the urge to urinate often. If you have UTIs often (called recurrent UTIs), you may be given antibiotics to take regularly. You will be given directions for when and how to use antibiotics. The goal is to prevent UTIs but not cause antibiotic resistance by using antibiotics too often.    Take your medicine as directed.  Contact your healthcare provider if you think your medicine is not helping or if you have side effects. Tell your provider if you  are allergic to any medicine. Keep a list of the medicines, vitamins, and herbs you take. Include the amounts, and when and why you take them. Bring the list or the pill bottles to follow-up visits. Carry your medicine list with you in case of an emergency.    Self-care:   Drink liquids as directed.  Liquids can help flush bacteria from your urinary tract. Ask how much liquid to drink each day and which liquids are best for you. You may need to drink more liquids than usual to help flush out the bacteria. Do not drink alcohol, caffeine, and citrus juices. These can irritate your bladder and increase your symptoms.    Apply heat  on your abdomen for 20 to 30 minutes every 2 hours for as many days as directed. Heat helps decrease discomfort and pressure in your bladder.    Prevent a UTI:   Urinate when you feel the urge.  Do not hold your urine. Bacteria can grow if urine stays in the bladder too long. It may be helpful to urinate at least every 3 to 4 hours.    Urinate after you have sex.  This will help flush away bacteria that can enter your urinary tract during sex.    Wear cotton underwear and clothes that are loose.  Tight pants and nylon underwear can trap moisture and cause bacteria to grow.    Drink cranberry juice or take cranberry supplements.  These may help prevent UTIs. Your healthcare provider can recommend the right juice or supplement for you.    Women should wipe front to back after urinating or having a bowel movement.  This may prevent germs from getting into the urinary tract. Do not douche or use feminine deodorants. These can change the chemical balance in your vagina. You may also be given vaginal estrogen medicine. This medicine helps prevent recurrent UTIs in women who have gone through menopause or are in nida-menopause.    Follow up with your doctor as directed:  Write down your questions so you remember to ask them during your visits.  © Copyright [employer] 2023 Information is for End  User's use only and may not be sold, redistributed or otherwise used for commercial purposes.  The above information is an  only. It is not intended as medical advice for individual conditions or treatments. Talk to your doctor, nurse or pharmacist before following any medical regimen to see if it is safe and effective for you.

## 2024-07-19 ENCOUNTER — TELEPHONE (OUTPATIENT)
Dept: FAMILY MEDICINE CLINIC | Facility: CLINIC | Age: 74
End: 2024-07-19

## 2024-07-19 ENCOUNTER — TELEPHONE (OUTPATIENT)
Age: 74
End: 2024-07-19

## 2024-07-19 DIAGNOSIS — N39.0 URINARY TRACT INFECTION WITHOUT HEMATURIA, SITE UNSPECIFIED: Primary | ICD-10-CM

## 2024-07-19 RX ORDER — LEVOFLOXACIN 250 MG/1
250 TABLET, FILM COATED ORAL EVERY 24 HOURS
Qty: 3 TABLET | Refills: 0 | Status: SHIPPED | OUTPATIENT
Start: 2024-07-19 | End: 2024-07-22

## 2024-07-19 NOTE — TELEPHONE ENCOUNTER
Patient called again regarding the Levaquin prescription that was sent to her pharmacy.   She is upset that a culture and sensitivity was not done on her previous urine sample and would like to know why that was not performed.  She is upset that she is now taking a second antibiotic without having the C&S done on her urine. Please advise.

## 2024-07-19 NOTE — TELEPHONE ENCOUNTER
Sensitivity was performed on the urine from 06/18/24. If she would like she does not have to start the levaquin and she can come by today to provide a sample of urine that will be dipped and sent out.

## 2024-07-19 NOTE — TELEPHONE ENCOUNTER
Patient called enquiring about urine culture and sensitivity results as she is still having symptoms of a UTI.  She finished antibiotic, Nitrofurantoin, and is still experiencing frequency, urgency, bladder pressure and burning with urination.  Denies fever.    No urine culture results found in chart.    Please follow up with patient to advise.

## 2024-07-31 ENCOUNTER — TELEPHONE (OUTPATIENT)
Age: 74
End: 2024-07-31

## 2024-07-31 NOTE — TELEPHONE ENCOUNTER
Lvm to let Pt know she has to schedule a virtual in order to get mediation and for UTI she would also have to be seen in office

## 2024-07-31 NOTE — TELEPHONE ENCOUNTER
Patient needs an appointment if she is covid positive and requesting antiviral medication.   If she is still having symptoms for UTI she needs to be evaluated in the office as well.

## 2024-07-31 NOTE — TELEPHONE ENCOUNTER
Patient called stating that her and her  both tested positive for Covid yesterday via home test.  Symptoms are mild with cough and low grade fever.  Patient's symptoms started on Monday after returning from vacation.  Patient asking about Paxlovid.  Offered to schedule office visit for her and her  to discuss this but she declined. She does not want to come into the office and also does not want a virtual visit either. Care advise given regarding supportive care and symptom management with OTC medications.    Patient also states that she continues to have burning with urination.  She was treated for a UTI with Macrobid on 6/18 and then also prescribed Levaquin on 7/19.  Patient would like to know if she can provide another urine sample to be tested.  Please follow up with patient to advise.

## 2024-08-21 ENCOUNTER — OFFICE VISIT (OUTPATIENT)
Dept: FAMILY MEDICINE CLINIC | Facility: CLINIC | Age: 74
End: 2024-08-21
Payer: COMMERCIAL

## 2024-08-21 VITALS
TEMPERATURE: 98 F | HEART RATE: 71 BPM | SYSTOLIC BLOOD PRESSURE: 112 MMHG | OXYGEN SATURATION: 99 % | BODY MASS INDEX: 24.73 KG/M2 | HEIGHT: 61 IN | DIASTOLIC BLOOD PRESSURE: 72 MMHG | WEIGHT: 131 LBS

## 2024-08-21 DIAGNOSIS — N39.0 RECURRENT UTI: Primary | ICD-10-CM

## 2024-08-21 DIAGNOSIS — N81.11 CYSTOCELE, MIDLINE: ICD-10-CM

## 2024-08-21 DIAGNOSIS — N81.2 INCOMPLETE UTEROVAGINAL PROLAPSE: ICD-10-CM

## 2024-08-21 DIAGNOSIS — N36.41 HYPERMOBILITY OF URETHRA: ICD-10-CM

## 2024-08-21 DIAGNOSIS — N81.84 PELVIC MUSCLE WASTING: ICD-10-CM

## 2024-08-21 LAB
SL AMB  POCT GLUCOSE, UA: ABNORMAL
SL AMB LEUKOCYTE ESTERASE,UA: 125
SL AMB POCT BILIRUBIN,UA: ABNORMAL
SL AMB POCT BLOOD,UA: 50
SL AMB POCT CLARITY,UA: ABNORMAL
SL AMB POCT COLOR,UA: ABNORMAL
SL AMB POCT KETONES,UA: ABNORMAL
SL AMB POCT NITRITE,UA: ABNORMAL
SL AMB POCT PH,UA: 7
SL AMB POCT SPECIFIC GRAVITY,UA: 1000
SL AMB POCT URINE PROTEIN: 15
SL AMB POCT UROBILINOGEN: 0.2

## 2024-08-21 PROCEDURE — 99213 OFFICE O/P EST LOW 20 MIN: CPT

## 2024-08-21 PROCEDURE — 87086 URINE CULTURE/COLONY COUNT: CPT

## 2024-08-21 PROCEDURE — 87077 CULTURE AEROBIC IDENTIFY: CPT

## 2024-08-21 PROCEDURE — 81001 URINALYSIS AUTO W/SCOPE: CPT

## 2024-08-21 PROCEDURE — 81002 URINALYSIS NONAUTO W/O SCOPE: CPT

## 2024-08-21 PROCEDURE — 87186 SC STD MICRODIL/AGAR DIL: CPT

## 2024-08-21 PROCEDURE — 87147 CULTURE TYPE IMMUNOLOGIC: CPT

## 2024-08-21 RX ORDER — ESTRADIOL 0.1 MG/G
2 CREAM VAGINAL DAILY
Qty: 42.5 G | Status: CANCELLED | OUTPATIENT
Start: 2024-08-21

## 2024-08-21 RX ORDER — LEVOFLOXACIN 500 MG/1
500 TABLET, FILM COATED ORAL EVERY 24 HOURS
Qty: 3 TABLET | Refills: 0 | Status: SHIPPED | OUTPATIENT
Start: 2024-08-21 | End: 2024-08-24

## 2024-08-21 NOTE — PROGRESS NOTES
Ambulatory Visit  Name: Ying Rodas      : 1950      MRN: [id number]  Encounter Provider: YENIFER Bella  Encounter Date: 2024   Encounter department: [facility]    Assessment & Plan   1. Recurrent UTI  -     Ambulatory Referral to Urogynecology; Future  -     UA w Reflex to Microscopic w Reflex to Culture  -     POCT urine dip  -     levofloxacin (LEVAQUIN) 500 mg tablet; Take 1 tablet (500 mg total) by mouth every 24 hours for 3 days  2. Incomplete uterovaginal prolapse  Assessment & Plan:  Previously evaluated and surgical repaired by urogyn approximately 5 years ago   Has had 3 UTIs in two months   Current medication: Premarin   Referral placed to Urogyn for further pelvic examination to see if this could be a contributing factor to the increase frequency of UTI  Orders:  -     Ambulatory Referral to Urogynecology; Future  3. Pelvic muscle wasting  4. Hypermobility of urethra  Assessment & Plan:  Previously evaluated by GYN   Recurrent UTI   5. Cystocele, midline  Assessment & Plan:  Associated diagnosis include pelvic muscle wasting, hypermobility of urethra, and recurrent UTI          History of Present Illness     Urinary Tract Infection   This is a recurrent problem. The current episode started more than 1 month ago (most recent flare up 3 days ago). The problem occurs every urination. The problem has been gradually worsening. The quality of the pain is described as burning. There has been no fever. She is Not sexually active. There is No history of pyelonephritis. Pertinent negatives include no chills, hematuria, nausea, urgency or vomiting. She has tried antibiotics and increased fluids (pyridium) for the symptoms. The treatment provided no relief. Her past medical history is significant for recurrent UTIs. prolapse     Review of Systems   Constitutional:  Negative for activity change, chills, fatigue and fever.   HENT:  Negative for congestion, ear  pain, rhinorrhea, sore throat and trouble swallowing.    Eyes:  Negative for pain and visual disturbance.   Respiratory:  Negative for cough, chest tightness and shortness of breath.    Cardiovascular:  Negative for chest pain, palpitations and leg swelling.   Gastrointestinal:  Negative for abdominal pain, constipation, diarrhea, nausea and vomiting.   Genitourinary:  Positive for dysuria. Negative for difficulty urinating, hematuria and urgency.   Musculoskeletal:  Negative for arthralgias and back pain.   Skin:  Negative for color change and rash.   Neurological:  Negative for dizziness, seizures, syncope and headaches.   Psychiatric/Behavioral:  Negative for dysphoric mood. The patient is not nervous/anxious.    All other systems reviewed and are negative.    Past Medical History:   Diagnosis Date    Anemia     Bulging of cervical intervertebral disc     Bulging disk neck.    Colon polyp 03/2009    History of anemia     9/27/21 Pt reports hx of anemia - corrected through prescription iron medication     History of narrow angle glaucoma 2012    Irregular heart beat     Takes for arrthymia    SVT (supraventricular tachycardia)     Palpitations      Past Surgical History:   Procedure Laterality Date    BREAST BIOPSY Right 2005    Stereo benign    CARDIAC ELECTROPHYSIOLOGY STUDY AND ABLATION  in 2014    COLONOSCOPY  03/25/2009    HERNIA REPAIR      Inguinal hernia - right side    POLYPECTOMY  03/25/2009    NJ CMBND ANTERPOST COLPORRAPHY W/CYSTO N/A 10/4/2021    Procedure: Anterior COLPORRHAPHY;  Surgeon: Urbano Mcgrath MD;  Location: [facility];  Service: UroGynecology           NJ COLPOPEXY VAGINAL EXTRAPERITONEAL APPROACH N/A 10/4/2021    Procedure: COLPOSUSPENSION VAGINAL EXTRAPERITONEAL(ENPLACE);  Surgeon: Urbano Mcgrath MD;  Location: [facility];  Service: UroGynecology           NJ CYSTOURETHROSCOPY N/A 10/4/2021    Procedure: CYSTOSCOPY;  Surgeon: Urbano Mcgrath MD;  Location: [facility];  Service:  "UroGynecology           LA SLING OPERATION STRESS INCONTINENCE N/A 10/4/2021    Procedure: PUBOVAGINAL SLING;  Surgeon: Urbano Mcgrath MD;  Location: [facility];  Service: UroGynecology           REFRACTIVE SURGERY       Family History   Problem Relation Age of Onset    No Known Problems Mother     Cervical polyp Father     Prostate cancer Father 80    Colon cancer Father 80    Colon cancer Sister 60    No Known Problems Sister     No Known Problems Daughter     No Known Problems Daughter     No Known Problems Maternal Grandmother     No Known Problems Maternal Grandfather     Colon cancer Paternal Grandmother 56    No Known Problems Paternal Grandfather     Kidney cancer Brother 60    Prostate cancer Brother 60    Cancer Brother 67        bladder    Leukemia Maternal Aunt     Lung cancer Maternal Aunt 49    Brain cancer Maternal Aunt     No Known Problems Maternal Aunt     No Known Problems Paternal Aunt     No Known Problems Paternal Aunt     No Known Problems Paternal Aunt     Ovarian cancer Cousin 50    Cancer Family         Colorectal and polyps     Social History     Tobacco Use    Smoking status: Never     Passive exposure: Never    Smokeless tobacco: Never   Vaping Use    Vaping status: Never Used   Substance and Sexual Activity    Alcohol use: Yes     Comment: occasional- \"every few months\"    Drug use: No     Comment: Denies    Sexual activity: Yes     Comment: Denies any chest pain or shortness of breath with activity      Current Outpatient Medications on File Prior to Visit   Medication Sig    acetaminophen (TYLENOL) 650 mg CR tablet Take 1 tablet (650 mg total) by mouth every 8 (eight) hours as needed for mild pain or moderate pain    diltiazem (CARDIZEM CD) 120 mg 24 hr capsule Take 120 mg by mouth daily    estrogens, conjugated (Premarin) vaginal cream Insert into the vagina    metoprolol succinate (TOPROL-XL) 50 mg 24 hr tablet Take 1 tablet by mouth daily 1 1/2 daily    Multiple Vitamins-Minerals " "(MULTIVITAMIN ADULT PO) Take 1 tablet by mouth daily 9/26/21 Pt instructed by general instruction sheet from Dr. Mcgrath office.    rosuvastatin (CRESTOR) 5 mg tablet One weekly     Allergies   Allergen Reactions    Codeine Nausea Only, Other (See Comments) and GI Intolerance     Other reaction(s): GI Intolerance  N/V  Other reaction(s): Nausea & vomiting  Other reaction(s): Other (See Comments)  Other reaction(s): GI Intolerance  N/V  Other reaction(s): Free Text, GI Intolerance, Nausea & vomiting  Other reaction(s): Nausea and Vomiting    Amoxicillin Hives and Swelling     Facial swelling    Latex Itching and Rash     9/27/21 Pt reports latex within tape - leaves rash on skin     Sulfa Antibiotics Swelling and Facial Swelling     Other reaction(s): Rash    Sulfamethoxazole      Other reaction(s): Rash    Sulfamethoxazole-Trimethoprim Other (See Comments)     facial swelling  Other reaction(s): Other (See Comments)  facial swelling    Trimethoprim Rash    Other Itching     Immunization History   Administered Date(s) Administered    COVID-19 PFIZER VACCINE 0.3 ML IM 02/22/2021, 03/16/2021, 10/18/2021    COVID-19 Pfizer Vac BIVALENT Jase-sucrose 12 Yr+ IM 09/19/2022    INFLUENZA 09/28/2015, 09/22/2016, 09/26/2017    Influenza Split 01/16/2014    Influenza Split High Dose Preservative Free IM 09/22/2016, 09/26/2017    Influenza, high dose seasonal 0.7 mL 10/02/2018, 11/14/2019, 10/13/2020, 11/23/2021, 11/07/2022, 01/03/2024    Influenza, seasonal, injectable 11/13/2012, 10/27/2014, 09/28/2015    Pneumococcal Conjugate 13-Valent 09/28/2015    Pneumococcal Polysaccharide PPV23 10/27/2014    Tdap 12/31/2015     Objective     /72 (BP Location: Left arm, Patient Position: Sitting, Cuff Size: Adult)   Pulse 71   Temp 98 °F (36.7 °C) (Temporal)   Ht 5' 1\" (1.549 m)   Wt 59.4 kg (131 lb)   SpO2 99%   BMI 24.75 kg/m²     Physical Exam  Vitals and nursing note reviewed.   Constitutional:       General: She is not in " acute distress.     Appearance: Normal appearance. She is well-developed and normal weight.   HENT:      Head: Normocephalic and atraumatic.      Right Ear: Tympanic membrane, ear canal and external ear normal. There is no impacted cerumen.      Left Ear: Tympanic membrane, ear canal and external ear normal. There is no impacted cerumen.      Nose: Nose normal.      Mouth/Throat:      Mouth: Mucous membranes are moist.      Pharynx: Oropharynx is clear.   Eyes:      Extraocular Movements: Extraocular movements intact.      Conjunctiva/sclera: Conjunctivae normal.      Pupils: Pupils are equal, round, and reactive to light.   Cardiovascular:      Rate and Rhythm: Normal rate and regular rhythm.      Pulses: Normal pulses.      Heart sounds: Normal heart sounds. No murmur heard.  Pulmonary:      Effort: Pulmonary effort is normal. No respiratory distress.      Breath sounds: Normal breath sounds.   Abdominal:      General: Bowel sounds are normal.      Palpations: Abdomen is soft.      Tenderness: There is no abdominal tenderness.   Musculoskeletal:         General: Normal range of motion.      Cervical back: Normal range of motion and neck supple.      Right lower leg: No edema.      Left lower leg: No edema.   Skin:     General: Skin is warm and dry.      Capillary Refill: Capillary refill takes less than 2 seconds.   Neurological:      General: No focal deficit present.      Mental Status: She is alert and oriented to person, place, and time. Mental status is at baseline.   Psychiatric:         Mood and Affect: Mood normal.         Behavior: Behavior normal.         Thought Content: Thought content normal.         Judgment: Judgment normal.

## 2024-08-22 LAB
BACTERIA UR QL AUTO: ABNORMAL /HPF
BILIRUB UR QL STRIP: NEGATIVE
CLARITY UR: ABNORMAL
COLOR UR: YELLOW
GLUCOSE UR STRIP-MCNC: NEGATIVE MG/DL
HGB UR QL STRIP.AUTO: ABNORMAL
KETONES UR STRIP-MCNC: NEGATIVE MG/DL
LEUKOCYTE ESTERASE UR QL STRIP: ABNORMAL
NITRITE UR QL STRIP: NEGATIVE
NON-SQ EPI CELLS URNS QL MICRO: ABNORMAL /HPF
PH UR STRIP.AUTO: 7 [PH]
PROT UR STRIP-MCNC: NEGATIVE MG/DL
RBC #/AREA URNS AUTO: ABNORMAL /HPF
SP GR UR STRIP.AUTO: 1.01 (ref 1–1.03)
UROBILINOGEN UR STRIP-ACNC: <2 MG/DL
WBC #/AREA URNS AUTO: ABNORMAL /HPF

## 2024-08-22 NOTE — ASSESSMENT & PLAN NOTE
Previously evaluated and surgical repaired by urogyn approximately 5 years ago   Has had 3 UTIs in two months   Current medication: Premarin   Referral placed to Urogyn for further pelvic examination to see if this could be a contributing factor to the increase frequency of UTI

## 2024-08-23 ENCOUNTER — TELEPHONE (OUTPATIENT)
Age: 74
End: 2024-08-23

## 2024-08-23 NOTE — TELEPHONE ENCOUNTER
Requesting UA culture 8/21/24. Still pending  Patient made aware still pending. We will reach out to her once they are finalized and reviewed.    
Patient

## 2024-08-24 LAB
BACTERIA UR CULT: ABNORMAL

## 2024-08-27 ENCOUNTER — TELEPHONE (OUTPATIENT)
Age: 74
End: 2024-08-27

## 2024-08-27 NOTE — TELEPHONE ENCOUNTER
Patient called to find out if her urine culture came back because she did not receive a call. Told her that it came back, read Martha's note about it:   Culture reviewed antibiotic prescribed give full coverage of all organisms.     Patient stated understanding. She said that the symptoms are much better but she continues to have mild burning with urination. She said she will call back if the burning continues.

## 2024-11-14 ENCOUNTER — TELEPHONE (OUTPATIENT)
Dept: ADMINISTRATIVE | Facility: OTHER | Age: 74
End: 2024-11-14

## 2024-11-14 NOTE — TELEPHONE ENCOUNTER
11/14/24 11:24 AM    Patient was flagged by a Third Party Payer report as being due for a refill on the following medications, ROSUVASTATIN TAB 5MG. Patient was contacted to review these medications. There was no answer and a message was left.     Thank you.  Carmen Griffith MA  [facility]

## 2025-01-07 ENCOUNTER — OFFICE VISIT (OUTPATIENT)
Dept: FAMILY MEDICINE CLINIC | Facility: CLINIC | Age: 75
End: 2025-01-07
Payer: COMMERCIAL

## 2025-01-07 VITALS
SYSTOLIC BLOOD PRESSURE: 122 MMHG | HEIGHT: 61 IN | DIASTOLIC BLOOD PRESSURE: 80 MMHG | HEART RATE: 68 BPM | WEIGHT: 126.8 LBS | BODY MASS INDEX: 23.94 KG/M2 | TEMPERATURE: 97.7 F | OXYGEN SATURATION: 97 %

## 2025-01-07 DIAGNOSIS — I47.10 PAROXYSMAL SVT (SUPRAVENTRICULAR TACHYCARDIA) (HCC): ICD-10-CM

## 2025-01-07 DIAGNOSIS — R30.0 DYSURIA: ICD-10-CM

## 2025-01-07 DIAGNOSIS — E55.9 VITAMIN D INSUFFICIENCY: ICD-10-CM

## 2025-01-07 DIAGNOSIS — E78.5 HYPERLIPIDEMIA, UNSPECIFIED HYPERLIPIDEMIA TYPE: ICD-10-CM

## 2025-01-07 DIAGNOSIS — E03.8 SUBCLINICAL HYPOTHYROIDISM: Chronic | ICD-10-CM

## 2025-01-07 DIAGNOSIS — Z23 ENCOUNTER FOR IMMUNIZATION: ICD-10-CM

## 2025-01-07 DIAGNOSIS — I48.3 TYPICAL ATRIAL FLUTTER (HCC): ICD-10-CM

## 2025-01-07 DIAGNOSIS — Z00.00 MEDICARE ANNUAL WELLNESS VISIT, SUBSEQUENT: Primary | ICD-10-CM

## 2025-01-07 DIAGNOSIS — D50.9 IRON DEFICIENCY ANEMIA, UNSPECIFIED IRON DEFICIENCY ANEMIA TYPE: ICD-10-CM

## 2025-01-07 PROCEDURE — G0008 ADMIN INFLUENZA VIRUS VAC: HCPCS | Performed by: FAMILY MEDICINE

## 2025-01-07 PROCEDURE — G0439 PPPS, SUBSEQ VISIT: HCPCS | Performed by: FAMILY MEDICINE

## 2025-01-07 PROCEDURE — 90662 IIV NO PRSV INCREASED AG IM: CPT | Performed by: FAMILY MEDICINE

## 2025-01-07 PROCEDURE — 99214 OFFICE O/P EST MOD 30 MIN: CPT | Performed by: FAMILY MEDICINE

## 2025-01-07 RX ORDER — LEVOFLOXACIN 500 MG/1
TABLET, FILM COATED ORAL
COMMUNITY
End: 2025-01-07

## 2025-01-07 NOTE — ASSESSMENT & PLAN NOTE
Lab Results   Component Value Date    LDLCALC 150 (H) 11/19/2020     Continue rosuvastatin 5mg weekly

## 2025-01-07 NOTE — PROGRESS NOTES
Name: Ying Rodas      : 1950      MRN: [id number]  Encounter Provider: Eliseo Eli DO  Encounter Date: 2025   Encounter department: [facility]    Assessment & Plan  Medicare annual wellness visit, subsequent         Paroxysmal SVT (supraventricular tachycardia) (HCC)  S/p ablation  Remains on metoprolol 50mg amd cardizem 120mg daily  Under care of cardiology       Subclinical hypothyroidism  Lab Results   Component Value Date    TSH 5.75 (H) 2021     Waxes and wanes  Remains asymptomatic  Will recheck tsh and t4 at next visit  Orders:  •  TSH, 3rd generation with Free T4 reflex; Future    Iron deficiency anemia, unspecified iron deficiency anemia type  Due for repeat cbc  Orders:  •  CBC and differential; Future  •  Iron Panel (Includes Ferritin, Iron Sat%, Iron, and TIBC); Future    Hyperlipidemia, unspecified hyperlipidemia type  Lab Results   Component Value Date    LDLCALC 150 (H) 2020     Continue rosuvastatin 5mg weekly       Vitamin D insufficiency    Orders:  •  Vitamin D 25 hydroxy; Future    Typical atrial flutter (HCC)  See plan for SVT       Dysuria  Chronic  Has had multiple UTI's in the past years  If patient develops dysuria will have her complete a UA  Reviewed last urin cultures with patient  May consider vaginal estrogen if infections continue  Previously followed with urogyn  Orders:  •  UA w Reflex to Microscopic w Reflex to Culture; Future    Encounter for immunization    Orders:  •  influenza vaccine, high-dose, PF 0.5 mL (Fluzone High Dose)       Preventive health issues were discussed with patient, and age appropriate screening tests were ordered as noted in patient's After Visit Summary. Personalized health advice and appropriate referrals for health education or preventive services given if needed, as noted in patient's After Visit Summary.    History of Present Illness     HPI presents today for awv. No concerns  beside flu vaccine  Patient Care Team:  YENIFER Bella as PCP - General (Nurse Practitioner)  Stephane Kingston MD as PCP - PCP-[facility] ([facility])    Review of Systems   Constitutional:  Negative for activity change, chills, diaphoresis and fever.   HENT:  Negative for ear pain, hearing loss, postnasal drip, rhinorrhea, sinus pressure, sinus pain, sneezing and sore throat.    Respiratory:  Negative for cough, chest tightness, shortness of breath and wheezing.    Cardiovascular:  Negative for chest pain, palpitations and leg swelling.   Gastrointestinal:  Negative for abdominal pain, blood in stool, constipation, diarrhea, nausea and vomiting.   Genitourinary:  Negative for dysuria, frequency, hematuria and urgency.   Musculoskeletal:  Negative for arthralgias and myalgias.   Neurological:  Negative for dizziness, syncope, weakness, light-headedness, numbness and headaches.     Medical History Reviewed by provider this encounter:  Tobacco  Allergies  Meds  Problems  Med Hx  Surg Hx  Fam Hx       Annual Wellness Visit Questionnaire   Ying is here for her Subsequent Wellness visit. Last Medicare Wellness visit information reviewed, patient interviewed and updates made to the record today.      Health Risk Assessment:   Patient rates overall health as good. Patient feels that their physical health rating is same. Patient is satisfied with their life. Eyesight was rated as same. Hearing was rated as same. Patient feels that their emotional and mental health rating is same. Patients states they are never, rarely angry. Patient states they are never, rarely unusually tired/fatigued. Pain experienced in the last 7 days has been none. Patient states that she has experienced no weight loss or gain in last 6 months.     Depression Screening:   PHQ-2 Score: 0      Fall Risk Screening:   In the past year, patient has experienced: no history of falling in past year      Urinary Incontinence Screening:    Patient has not leaked urine accidently in the last six months.     Home Safety:  Patient does not have trouble with stairs inside or outside of their home. Patient has working smoke alarms and has no working carbon monoxide detector. Home safety hazards include: none.     Medications:   Patient is currently taking over-the-counter supplements. OTC medications include: see medication list. Patient is able to manage medications.     Activities of Daily Living (ADLs)/Instrumental Activities of Daily Living (IADLs):   Walk and transfer into and out of bed and chair?: Yes  Dress and groom yourself?: Yes    Bathe or shower yourself?: Yes    Feed yourself? Yes  Do your laundry/housekeeping?: Yes  Manage your money, pay your bills and track your expenses?: Yes  Make your own meals?: Yes    Do your own shopping?: Yes    Previous Hospitalizations:   Any hospitalizations or ED visits within the last 12 months?: No      Advance Care Planning:   Living will: Yes    Advanced directive: Yes    Advanced directive counseling given: Yes    Provider agrees with end of life decisions: Yes      Cognitive Screening:   Provider or family/friend/caregiver concerned regarding cognition?: No    PREVENTIVE SCREENINGS      Cardiovascular Screening:    General: Screening Not Indicated and History Lipid Disorder      Diabetes Screening:     General: Screening Current      Colorectal Cancer Screening:     General: Screening Current      Breast Cancer Screening:     General: Screening Current      Cervical Cancer Screening:    General: Screening Not Indicated      Osteoporosis Screening:    General: Screening Current      Abdominal Aortic Aneurysm (AAA) Screening:        General: Screening Not Indicated      Lung Cancer Screening:     General: Screening Not Indicated      Hepatitis C Screening:    General: Screening Current    Other Counseling Topics:   Car/seat belt/driving safety, skin self-exam, sunscreen and regular weightbearing exercise  "and calcium and vitamin D intake.     Social Drivers of Health     Financial Resource Strain: Low Risk  (1/3/2024)    Overall Financial Resource Strain (CARDIA)    • Difficulty of Paying Living Expenses: Not very hard   Food Insecurity: No Food Insecurity (1/7/2025)    Hunger Vital Sign    • Worried About Running Out of Food in the Last Year: Never true    • Ran Out of Food in the Last Year: Never true   Transportation Needs: No Transportation Needs (1/7/2025)    PRAPARE - Transportation    • Lack of Transportation (Medical): No    • Lack of Transportation (Non-Medical): No   Housing Stability: Low Risk  (1/7/2025)    Housing Stability Vital Sign    • Unable to Pay for Housing in the Last Year: No    • Number of Times Moved in the Last Year: 0    • Homeless in the Last Year: No   Utilities: Not At Risk (1/7/2025)    [facility] Utilities    • Threatened with loss of utilities: No     No results found.    Objective   /80 (BP Location: Left arm, Patient Position: Sitting, Cuff Size: Large)   Pulse 68   Temp 97.7 °F (36.5 °C) (Temporal)   Ht 5' 1\" (1.549 m)   Wt 57.5 kg (126 lb 12.8 oz)   SpO2 97%   BMI 23.96 kg/m²     Physical Exam  Constitutional:       General: She is not in acute distress.     Appearance: Normal appearance. She is well-developed. She is not diaphoretic.   HENT:      Head: Normocephalic and atraumatic.      Right Ear: Tympanic membrane, ear canal and external ear normal. There is no impacted cerumen.      Left Ear: Tympanic membrane, ear canal and external ear normal. There is no impacted cerumen.      Nose: Nose normal. No congestion or rhinorrhea.      Mouth/Throat:      Mouth: Mucous membranes are moist.      Pharynx: Oropharynx is clear. No oropharyngeal exudate.   Eyes:      Conjunctiva/sclera: Conjunctivae normal.      Pupils: Pupils are equal, round, and reactive to light.   Neck:      Vascular: No JVD.   Cardiovascular:      Rate and Rhythm: Normal rate and regular rhythm.      Heart " sounds: Normal heart sounds. No murmur heard.     No friction rub. No gallop.   Pulmonary:      Effort: Pulmonary effort is normal. No respiratory distress.      Breath sounds: Normal breath sounds. No wheezing or rales.   Chest:      Chest wall: No tenderness.   Abdominal:      General: Bowel sounds are normal. There is no distension.      Palpations: Abdomen is soft.      Tenderness: There is no abdominal tenderness. There is no right CVA tenderness, left CVA tenderness, guarding or rebound.   Musculoskeletal:         General: No tenderness. Normal range of motion.      Cervical back: No tenderness.   Lymphadenopathy:      Cervical: No cervical adenopathy.   Skin:     General: Skin is warm and dry.   Neurological:      Mental Status: She is alert and oriented to person, place, and time.      Cranial Nerves: No cranial nerve deficit.   Psychiatric:         Mood and Affect: Mood and affect normal.         Behavior: Behavior normal.

## 2025-01-07 NOTE — PATIENT INSTRUCTIONS
Medicare Preventive Visit Patient Instructions  Thank you for completing your Welcome to Medicare Visit or Medicare Annual Wellness Visit today. Your next wellness visit will be due in one year (1/8/2026).  The screening/preventive services that you may require over the next 5-10 years are detailed below. Some tests may not apply to you based off risk factors and/or age. Screening tests ordered at today's visit but not completed yet may show as past due. Also, please note that scanned in results may not display below.  Preventive Screenings:  Service Recommendations Previous Testing/Comments   Colorectal Cancer Screening  * Colonoscopy    * Fecal Occult Blood Test (FOBT)/Fecal Immunochemical Test (FIT)  * Fecal DNA/Cologuard Test  * Flexible Sigmoidoscopy Age: 45-75 years old   Colonoscopy: every 10 years (may be performed more frequently if at higher risk)  OR  FOBT/FIT: every 1 year  OR  Cologuard: every 3 years  OR  Sigmoidoscopy: every 5 years  Screening may be recommended earlier than age 45 if at higher risk for colorectal cancer. Also, an individualized decision between you and your healthcare provider will decide whether screening between the ages of 76-85 would be appropriate. Colonoscopy: 07/28/2023  FOBT/FIT: Not on file  Cologuard: Not on file  Sigmoidoscopy: Not on file    Screening Current     Breast Cancer Screening Age: 40+ years old  Frequency: every 1-2 years  Not required if history of left and right mastectomy Mammogram: 01/24/2024    Screening Current   Cervical Cancer Screening Between the ages of 21-29, pap smear recommended once every 3 years.   Between the ages of 30-65, can perform pap smear with HPV co-testing every 5 years.   Recommendations may differ for women with a history of total hysterectomy, cervical cancer, or abnormal pap smears in past. Pap Smear: Not on file    Screening Not Indicated   Hepatitis C Screening Once for adults born between 1945 and 1965  More frequently in  patients at high risk for Hepatitis C Hep C Antibody: 12/26/2018    Screening Current   Diabetes Screening 1-2 times per year if you're at risk for diabetes or have pre-diabetes Fasting glucose: No results in last 5 years (No results in last 5 years)  A1C: No results in last 5 years (No results in last 5 years)  Screening Current   Cholesterol Screening Once every 5 years if you don't have a lipid disorder. May order more often based on risk factors. Lipid panel: 03/28/2024    Screening Not Indicated  History Lipid Disorder     Other Preventive Screenings Covered by Medicare:  Abdominal Aortic Aneurysm (AAA) Screening: covered once if your at risk. You're considered to be at risk if you have a family history of AAA.  Lung Cancer Screening: covers low dose CT scan once per year if you meet all of the following conditions: (1) Age 55-77; (2) No signs or symptoms of lung cancer; (3) Current smoker or have quit smoking within the last 15 years; (4) You have a tobacco smoking history of at least 20 pack years (packs per day multiplied by number of years you smoked); (5) You get a written order from a healthcare provider.  Glaucoma Screening: covered annually if you're considered high risk: (1) You have diabetes OR (2) Family history of glaucoma OR (3)  aged 50 and older OR (4)  American aged 65 and older  Osteoporosis Screening: covered every 2 years if you meet one of the following conditions: (1) You're estrogen deficient and at risk for osteoporosis based off medical history and other findings; (2) Have a vertebral abnormality; (3) On glucocorticoid therapy for more than 3 months; (4) Have primary hyperparathyroidism; (5) On osteoporosis medications and need to assess response to drug therapy.   Last bone density test (DXA Scan): 06/01/2022.  HIV Screening: covered annually if you're between the age of 15-65. Also covered annually if you are younger than 15 and older than 65 with risk factors  for HIV infection. For pregnant patients, it is covered up to 3 times per pregnancy.    Immunizations:  Immunization Recommendations   Influenza Vaccine Annual influenza vaccination during flu season is recommended for all persons aged >= 6 months who do not have contraindications   Pneumococcal Vaccine   * Pneumococcal conjugate vaccine = PCV13 (Prevnar 13), PCV15 (Vaxneuvance), PCV20 (Prevnar 20)  * Pneumococcal polysaccharide vaccine = PPSV23 (Pneumovax) Adults 19-63 yo with certain risk factors or if 65+ yo  If never received any pneumonia vaccine: recommend Prevnar 20 (PCV20)  Give PCV20 if previously received 1 dose of PCV13 or PPSV23   Hepatitis B Vaccine 3 dose series if at intermediate or high risk (ex: diabetes, end stage renal disease, liver disease)   Respiratory syncytial virus (RSV) Vaccine - COVERED BY MEDICARE PART D  * RSVPreF3 (Arexvy) CDC recommends that adults 60 years of age and older may receive a single dose of RSV vaccine using shared clinical decision-making (SCDM)   Tetanus (Td) Vaccine - COST NOT COVERED BY MEDICARE PART B Following completion of primary series, a booster dose should be given every 10 years to maintain immunity against tetanus. Td may also be given as tetanus wound prophylaxis.   Tdap Vaccine - COST NOT COVERED BY MEDICARE PART B Recommended at least once for all adults. For pregnant patients, recommended with each pregnancy.   Shingles Vaccine (Shingrix) - COST NOT COVERED BY MEDICARE PART B  2 shot series recommended in those 19 years and older who have or will have weakened immune systems or those 50 years and older     Health Maintenance Due:      Topic Date Due   • Breast Cancer Screening: Mammogram  01/24/2025   • Colorectal Cancer Screening  07/28/2028   • Hepatitis C Screening  Completed     Immunizations Due:      Topic Date Due   • Influenza Vaccine (1) 09/01/2024   • COVID-19 Vaccine (5 - 2024-25 season) 09/01/2024     Advance Directives   What are advance  directives?  Advance directives are legal documents that state your wishes and plans for medical care. These plans are made ahead of time in case you lose your ability to make decisions for yourself. Advance directives can apply to any medical decision, such as the treatments you want, and if you want to donate organs.   What are the types of advance directives?  There are many types of advance directives, and each state has rules about how to use them. You may choose a combination of any of the following:  Living will:  This is a written record of the treatment you want. You can also choose which treatments you do not want, which to limit, and which to stop at a certain time. This includes surgery, medicine, IV fluid, and tube feedings.   Durable power of  for healthcare (DPAHC):  This is a written record that states who you want to make healthcare choices for you when you are unable to make them for yourself. This person, called a proxy, is usually a family member or a friend. You may choose more than 1 proxy.  Do not resuscitate (DNR) order:  A DNR order is used in case your heart stops beating or you stop breathing. It is a request not to have certain forms of treatment, such as CPR. A DNR order may be included in other types of advance directives.  Medical directive:  This covers the care that you want if you are in a coma, near death, or unable to make decisions for yourself. You can list the treatments you want for each condition. Treatment may include pain medicine, surgery, blood transfusions, dialysis, IV or tube feedings, and a ventilator (breathing machine).  Values history:  This document has questions about your views, beliefs, and how you feel and think about life. This information can help others choose the care that you would choose.  Why are advance directives important?  An advance directive helps you control your care. Although spoken wishes may be used, it is better to have your wishes  written down. Spoken wishes can be misunderstood, or not followed. Treatments may be given even if you do not want them. An advance directive may make it easier for your family to make difficult choices about your care.       © Copyright [employer] 2018 Information is for End User's use only and may not be sold, redistributed or otherwise used for commercial purposes. All illustrations and images included in CareNotes® are the copyrighted property of A.D.A[employer]M., Inc. or [employer]

## 2025-01-07 NOTE — ASSESSMENT & PLAN NOTE
Due for repeat cbc  Orders:  •  CBC and differential; Future  •  Iron Panel (Includes Ferritin, Iron Sat%, Iron, and TIBC); Future

## 2025-01-07 NOTE — ASSESSMENT & PLAN NOTE
Lab Results   Component Value Date    TSH 5.75 (H) 05/19/2021     Waxes and wanes  Remains asymptomatic  Will recheck tsh and t4 at next visit  Orders:  •  TSH, 3rd generation with Free T4 reflex; Future

## 2025-01-13 ENCOUNTER — CLINICAL SUPPORT (OUTPATIENT)
Dept: FAMILY MEDICINE CLINIC | Facility: CLINIC | Age: 75
End: 2025-01-13
Payer: COMMERCIAL

## 2025-01-13 DIAGNOSIS — Z23 NEED FOR COVID-19 VACCINE: Primary | ICD-10-CM

## 2025-01-13 PROCEDURE — NURSE

## 2025-01-13 PROCEDURE — 90480 ADMN SARSCOV2 VAC 1/ONLY CMP: CPT

## 2025-01-13 PROCEDURE — 91320 SARSCV2 VAC 30MCG TRS-SUC IM: CPT

## 2025-03-20 ENCOUNTER — OFFICE VISIT (OUTPATIENT)
Dept: OBGYN CLINIC | Facility: CLINIC | Age: 75
End: 2025-03-20
Payer: COMMERCIAL

## 2025-03-20 VITALS
WEIGHT: 125.8 LBS | DIASTOLIC BLOOD PRESSURE: 74 MMHG | SYSTOLIC BLOOD PRESSURE: 120 MMHG | BODY MASS INDEX: 23.75 KG/M2 | HEIGHT: 61 IN

## 2025-03-20 DIAGNOSIS — Z01.419 ENCOUNTER FOR ANNUAL ROUTINE GYNECOLOGICAL EXAMINATION: Primary | ICD-10-CM

## 2025-03-20 DIAGNOSIS — N81.11 CYSTOCELE, MIDLINE: ICD-10-CM

## 2025-03-20 DIAGNOSIS — M85.80 OSTEOPENIA, UNSPECIFIED LOCATION: ICD-10-CM

## 2025-03-20 DIAGNOSIS — N95.2 VAGINAL ATROPHY: ICD-10-CM

## 2025-03-20 DIAGNOSIS — Z12.31 ENCOUNTER FOR SCREENING MAMMOGRAM FOR MALIGNANT NEOPLASM OF BREAST: ICD-10-CM

## 2025-03-20 PROCEDURE — G0101 CA SCREEN;PELVIC/BREAST EXAM: HCPCS | Performed by: OBSTETRICS & GYNECOLOGY

## 2025-03-20 RX ORDER — CONJUGATED ESTROGENS 0.62 MG/G
0.5 CREAM VAGINAL WEEKLY
Qty: 30 G | Refills: 1 | Status: SHIPPED | OUTPATIENT
Start: 2025-03-20

## 2025-03-20 NOTE — PROGRESS NOTES
Name: Ying Rodas      : 1950      MRN: [id number]  Encounter Provider: Aruna Torres DO  Encounter Date: 3/20/2025   Encounter department: [facility]  :  Assessment & Plan  Encounter for annual routine gynecological examination         Encounter for screening mammogram for malignant neoplasm of breast    Orders:    Mammo screening bilateral w 3d and cad; Future    Osteopenia, unspecified location         Vaginal atrophy    Orders:    estrogens, conjugated (Premarin) vaginal cream; Insert 0.5 g into the vagina once a week    Pap smear deferred due to low risk status.  Reviewed cervical cancer screening guidelines.  All questions answered.  Encourage self breast examination as well as calcium supplementation.  Continue annual mammogram, scheduled.  Reviewed colon cancer screening, up to date.  Reviewed urogyn surgery, mild pelvic floor prolapse.  Continue to use vaginal estrogen weekly.  All questions answered.  She will continue to follow-up with primary care and cardiology as scheduled.  Return to office in 1 year follow-up pelvic floor prolapse, history of urogyn surgery, on vaginal estrogen or as needed    History of Present Illness   HPI  Ying Rodas is a 74 y.o. female who presents     This is a pleasant 74-year-old female P3 ( x 3) presents as a new patient for GYN exam.  She states her GYN physician retired recently.  Patient went through menopause at age 61.  She has never been on hormone replacement therapy.  She denies any vaginal bleeding or spotting.  No changes in bowel or bladder function.  She has been  for over 54 years.  Pap smears have been normal.  Last Pap .  She states her family physician does Pap smears every year.  She underwent vaginal reconstructive surgery  including anterior colporrhaphy, suburethral sling, colposuspension.  This was a difficult recovery.  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  She was using vaginal  estrogen and is now titrated down to once weekly.    History of pelvic floor prolapse, failed pessary and then referred to urogynecology underwent surgical intervention , feels slight increase in pressure over the last several months concern regarding recurrence    Follows up with cardiology every 4 months, history of arrhythmia.    X 52 yr     x 3    2024 mammo, scheduled    Colon 2023,     Pap    Dexa 2022 ANt repair, sling, colposuspension       History obtained from: patient    Review of Systems   Constitutional:  Negative for fatigue, fever and unexpected weight change.   Respiratory:  Negative for cough, chest tightness, shortness of breath and wheezing.    Cardiovascular: Negative.  Negative for chest pain and palpitations.   Gastrointestinal: Negative.  Negative for abdominal distention, abdominal pain, blood in stool, constipation, diarrhea, nausea and vomiting.   Genitourinary: Negative.  Negative for difficulty urinating, dyspareunia, dysuria, flank pain, frequency, genital sores, hematuria, pelvic pain, urgency, vaginal bleeding, vaginal discharge and vaginal pain.   Skin:  Negative for rash.     Current Outpatient Medications on File Prior to Visit   Medication Sig Dispense Refill    acetaminophen (TYLENOL) 650 mg CR tablet Take 1 tablet (650 mg total) by mouth every 8 (eight) hours as needed for mild pain or moderate pain 30 tablet 0    diltiazem (CARDIZEM CD) 120 mg 24 hr capsule Take 120 mg by mouth daily      metoprolol succinate (TOPROL-XL) 50 mg 24 hr tablet Take 1 tablet by mouth daily 1 1/2 daily      Multiple Vitamins-Minerals (MULTIVITAMIN ADULT PO) Take 1 tablet by mouth daily 21 Pt instructed by general instruction sheet from Dr. Mcgrath office.      rosuvastatin (CRESTOR) 5 mg tablet One weekly      [DISCONTINUED] estrogens, conjugated (Premarin) vaginal cream Insert into the vagina       No current facility-administered medications on file prior to visit.        "  Objective   /74 (BP Location: Left arm, Patient Position: Sitting)   Ht 5' 1\" (1.549 m)   Wt 57.1 kg (125 lb 12.8 oz)   BMI 23.77 kg/m²      Physical Exam  Constitutional:       Appearance: Normal appearance. She is well-developed.   HENT:      Head: Normocephalic and atraumatic.   Cardiovascular:      Rate and Rhythm: Normal rate and regular rhythm.   Pulmonary:      Effort: Pulmonary effort is normal.      Breath sounds: Normal breath sounds.   Chest:   Breasts:     Right: No inverted nipple, mass, nipple discharge, skin change or tenderness.      Left: No inverted nipple, mass, nipple discharge, skin change or tenderness.   Abdominal:      General: Bowel sounds are normal. There is no distension.      Palpations: Abdomen is soft.      Tenderness: There is no abdominal tenderness. There is no guarding or rebound.   Genitourinary:     Labia:         Right: No rash, tenderness or lesion.         Left: No rash, tenderness or lesion.       Vagina: Normal. No signs of injury. No vaginal discharge or tenderness.      Cervix: No cervical motion tenderness, discharge, friability, lesion or cervical bleeding.      Uterus: Not enlarged and not tender.       Adnexa:         Right: No mass, tenderness or fullness.          Left: No mass, tenderness or fullness.     Neurological:      Mental Status: She is alert.   Psychiatric:         Behavior: Behavior normal.     External genitalia is within normal limits.  The vagina is well estrogenized.  Cervix is small the os is stenotic.  She does have a mild/moderate cystocele.  No evidence of mesh erosion.    Administrative Statements   I have spent a total time of 40 minutes in caring for this patient on the day of the visit/encounter including Risks and benefits of tx options, Instructions for management, Impressions, Counseling / Coordination of care, Documenting in the medical record, Reviewing/placing orders in the medical record (including tests, medications, and/or " procedures), and Obtaining or reviewing history  .

## 2025-03-27 ENCOUNTER — HOSPITAL ENCOUNTER (OUTPATIENT)
Dept: RADIOLOGY | Age: 75
Discharge: HOME | End: 2025-03-27
Payer: COMMERCIAL

## 2025-03-27 DIAGNOSIS — Z12.31 ENCOUNTER FOR SCREENING MAMMOGRAM FOR MALIGNANT NEOPLASM OF BREAST: ICD-10-CM

## 2025-03-27 PROCEDURE — 77063 BREAST TOMOSYNTHESIS BI: CPT

## 2025-03-27 PROCEDURE — 77067 SCR MAMMO BI INCL CAD: CPT

## 2025-07-11 ENCOUNTER — APPOINTMENT (OUTPATIENT)
Dept: LAB | Age: 75
End: 2025-07-11
Attending: FAMILY MEDICINE
Payer: COMMERCIAL

## 2025-07-11 DIAGNOSIS — R30.0 DYSURIA: ICD-10-CM

## 2025-07-11 DIAGNOSIS — D50.9 IRON DEFICIENCY ANEMIA, UNSPECIFIED IRON DEFICIENCY ANEMIA TYPE: ICD-10-CM

## 2025-07-11 DIAGNOSIS — E03.8 SUBCLINICAL HYPOTHYROIDISM: Chronic | ICD-10-CM

## 2025-07-11 DIAGNOSIS — E55.9 VITAMIN D INSUFFICIENCY: ICD-10-CM

## 2025-07-11 LAB
25(OH)D3 SERPL-MCNC: 29.4 NG/ML (ref 30–100)
BASOPHILS # BLD AUTO: 0.05 THOUSANDS/ÂΜL (ref 0–0.1)
BASOPHILS NFR BLD AUTO: 1 % (ref 0–1)
EOSINOPHIL # BLD AUTO: 0.09 THOUSAND/ÂΜL (ref 0–0.61)
EOSINOPHIL NFR BLD AUTO: 2 % (ref 0–6)
ERYTHROCYTE [DISTWIDTH] IN BLOOD BY AUTOMATED COUNT: 12.5 % (ref 11.6–15.1)
FERRITIN SERPL-MCNC: 39 NG/ML (ref 30–307)
HCT VFR BLD AUTO: 39 % (ref 34.8–46.1)
HGB BLD-MCNC: 12.9 G/DL (ref 11.5–15.4)
IMM GRANULOCYTES # BLD AUTO: 0.01 THOUSAND/UL (ref 0–0.2)
IMM GRANULOCYTES NFR BLD AUTO: 0 % (ref 0–2)
IRON SATN MFR SERPL: 19 % (ref 15–50)
IRON SERPL-MCNC: 67 UG/DL (ref 50–212)
LYMPHOCYTES # BLD AUTO: 1.43 THOUSANDS/ÂΜL (ref 0.6–4.47)
LYMPHOCYTES NFR BLD AUTO: 30 % (ref 14–44)
MCH RBC QN AUTO: 29.3 PG (ref 26.8–34.3)
MCHC RBC AUTO-ENTMCNC: 33.1 G/DL (ref 31.4–37.4)
MCV RBC AUTO: 89 FL (ref 82–98)
MONOCYTES # BLD AUTO: 0.32 THOUSAND/ÂΜL (ref 0.17–1.22)
MONOCYTES NFR BLD AUTO: 7 % (ref 4–12)
NEUTROPHILS # BLD AUTO: 2.8 THOUSANDS/ÂΜL (ref 1.85–7.62)
NEUTS SEG NFR BLD AUTO: 60 % (ref 43–75)
NRBC BLD AUTO-RTO: 0 /100 WBCS
PLATELET # BLD AUTO: 223 THOUSANDS/UL (ref 149–390)
PMV BLD AUTO: 10.5 FL (ref 8.9–12.7)
RBC # BLD AUTO: 4.4 MILLION/UL (ref 3.81–5.12)
TIBC SERPL-MCNC: 351.4 UG/DL (ref 250–450)
TRANSFERRIN SERPL-MCNC: 251 MG/DL (ref 203–362)
TSH SERPL DL<=0.05 MIU/L-ACNC: 3.92 UIU/ML (ref 0.45–4.5)
UIBC SERPL-MCNC: 284 UG/DL (ref 155–355)
WBC # BLD AUTO: 4.7 THOUSAND/UL (ref 4.31–10.16)

## 2025-07-11 PROCEDURE — 85025 COMPLETE CBC W/AUTO DIFF WBC: CPT

## 2025-07-11 PROCEDURE — 84443 ASSAY THYROID STIM HORMONE: CPT

## 2025-07-11 PROCEDURE — 82306 VITAMIN D 25 HYDROXY: CPT

## 2025-07-11 PROCEDURE — 83540 ASSAY OF IRON: CPT

## 2025-07-11 PROCEDURE — 36415 COLL VENOUS BLD VENIPUNCTURE: CPT

## 2025-07-11 PROCEDURE — 83550 IRON BINDING TEST: CPT

## 2025-07-11 PROCEDURE — 82728 ASSAY OF FERRITIN: CPT

## 2025-07-16 ENCOUNTER — OFFICE VISIT (OUTPATIENT)
Dept: FAMILY MEDICINE CLINIC | Facility: CLINIC | Age: 75
End: 2025-07-16
Payer: COMMERCIAL

## 2025-07-16 VITALS
SYSTOLIC BLOOD PRESSURE: 120 MMHG | RESPIRATION RATE: 18 BRPM | DIASTOLIC BLOOD PRESSURE: 76 MMHG | TEMPERATURE: 97.7 F | HEART RATE: 68 BPM | OXYGEN SATURATION: 97 %

## 2025-07-16 DIAGNOSIS — D50.9 IRON DEFICIENCY ANEMIA, UNSPECIFIED IRON DEFICIENCY ANEMIA TYPE: ICD-10-CM

## 2025-07-16 DIAGNOSIS — Z13.1 SCREENING FOR DIABETES MELLITUS: ICD-10-CM

## 2025-07-16 DIAGNOSIS — E55.9 VITAMIN D INSUFFICIENCY: ICD-10-CM

## 2025-07-16 DIAGNOSIS — E78.5 HYPERLIPIDEMIA, UNSPECIFIED HYPERLIPIDEMIA TYPE: ICD-10-CM

## 2025-07-16 DIAGNOSIS — S52.501D CLOSED FRACTURE OF DISTAL END OF RIGHT RADIUS WITH ROUTINE HEALING, UNSPECIFIED FRACTURE MORPHOLOGY, SUBSEQUENT ENCOUNTER: Primary | ICD-10-CM

## 2025-07-16 DIAGNOSIS — E03.8 SUBCLINICAL HYPOTHYROIDISM: Chronic | ICD-10-CM

## 2025-07-16 PROCEDURE — 99214 OFFICE O/P EST MOD 30 MIN: CPT | Performed by: FAMILY MEDICINE

## 2025-07-16 RX ORDER — TRAMADOL HYDROCHLORIDE 50 MG/1
50 TABLET ORAL EVERY 6 HOURS PRN
Qty: 5 TABLET | Refills: 0 | Status: SHIPPED | OUTPATIENT
Start: 2025-07-16 | End: 2025-07-18 | Stop reason: SDUPTHER

## 2025-07-16 NOTE — ASSESSMENT & PLAN NOTE
Lab Results   Component Value Date    WBC 4.70 07/11/2025    HGB 12.9 07/11/2025    HCT 39.0 07/11/2025    MCV 89 07/11/2025     07/11/2025     Stable  Continue to monitor    Orders:  •  CBC and differential; Future

## 2025-07-16 NOTE — PROGRESS NOTES
Name: Ying Rodas      : 1950      MRN: [id number]  Encounter Provider: Eliseo Eli DO  Encounter Date: 2025   Encounter department: [facility]  :  Assessment & Plan  Closed fracture of distal end of right radius with routine healing, unspecified fracture morphology, subsequent encounter  Under care of orthopedics  Has brace in place  Has follow up with ortho in 6 weeks and repeat xr  Orders:  •  traMADol (Ultram) 50 mg tablet; Take 1 tablet (50 mg total) by mouth every 6 (six) hours as needed for moderate pain    Vitamin D insufficiency  Start vit d3 2000 units daily  Orders:  •  Cholecalciferol (VITAMIN D3) 1,000 units tablet; Take 1 tablet (1,000 Units total) by mouth daily  •  Vitamin D 25 hydroxy; Future    Subclinical hypothyroidism  Lab Results   Component Value Date    [id number] 3.915 2025    TSH 5.75 (H) 2021     Stable    Orders:  •  TSH, 3rd generation with Free T4 reflex; Future    Iron deficiency anemia, unspecified iron deficiency anemia type  Lab Results   Component Value Date    WBC 4.70 2025    HGB 12.9 2025    HCT 39.0 2025    MCV 89 2025     2025     Stable  Continue to monitor    Orders:  •  CBC and differential; Future    Hyperlipidemia, unspecified hyperlipidemia type  Lab Results   Component Value Date    LDLCALC 150 (H) 2020     Continue rosuvastatin 5mg  Orders:  •  Lipid panel; Future    Screening for diabetes mellitus    Orders:  •  Comprehensive metabolic panel; Future           History of Present Illness   HPI presents today for routine follow up and to discuss blood work results.  Review of Systems   Constitutional:  Negative for activity change, chills, diaphoresis and fever.   HENT:  Negative for ear pain, hearing loss, postnasal drip, rhinorrhea, sinus pressure, sinus pain, sneezing and sore throat.    Respiratory:  Negative for cough, chest tightness, shortness  of breath and wheezing.    Cardiovascular:  Negative for chest pain, palpitations and leg swelling.   Gastrointestinal:  Negative for abdominal pain, blood in stool, constipation, diarrhea, nausea and vomiting.   Genitourinary:  Negative for dysuria, frequency, hematuria and urgency.   Musculoskeletal:  Negative for arthralgias and myalgias.   Neurological:  Negative for dizziness, syncope, weakness, light-headedness, numbness and headaches.       Objective   /76 (BP Location: Left arm, Patient Position: Sitting, Cuff Size: Standard)   Pulse 68   Temp 97.7 °F (36.5 °C) (Temporal)   Resp 18   SpO2 97%      Physical Exam  Constitutional:       General: She is not in acute distress.     Appearance: Normal appearance. She is well-developed. She is not diaphoretic.   HENT:      Head: Normocephalic and atraumatic.      Right Ear: Tympanic membrane, ear canal and external ear normal. There is no impacted cerumen.      Left Ear: Tympanic membrane, ear canal and external ear normal. There is no impacted cerumen.      Nose: Nose normal. No congestion or rhinorrhea.      Mouth/Throat:      Mouth: Mucous membranes are moist.      Pharynx: Oropharynx is clear. No oropharyngeal exudate.     Eyes:      Conjunctiva/sclera: Conjunctivae normal.      Pupils: Pupils are equal, round, and reactive to light.     Neck:      Vascular: No JVD.     Cardiovascular:      Rate and Rhythm: Normal rate and regular rhythm.      Heart sounds: Normal heart sounds. No murmur heard.     No friction rub. No gallop.   Pulmonary:      Effort: Pulmonary effort is normal. No respiratory distress.      Breath sounds: Normal breath sounds. No wheezing or rales.   Chest:      Chest wall: No tenderness.   Abdominal:      General: Bowel sounds are normal. There is no distension.      Palpations: Abdomen is soft.      Tenderness: There is no abdominal tenderness. There is no right CVA tenderness, left CVA tenderness, guarding or rebound.      Musculoskeletal:         General: No tenderness. Normal range of motion.      Cervical back: No tenderness.   Lymphadenopathy:      Cervical: No cervical adenopathy.     Skin:     General: Skin is warm and dry.     Neurological:      Mental Status: She is alert and oriented to person, place, and time.      Cranial Nerves: No cranial nerve deficit.     Psychiatric:         Mood and Affect: Mood and affect normal.         Behavior: Behavior normal.

## 2025-07-16 NOTE — ASSESSMENT & PLAN NOTE
Lab Results   Component Value Date    [id number] 3.915 07/11/2025    TSH 5.75 (H) 05/19/2021     Stable    Orders:  •  TSH, 3rd generation with Free T4 reflex; Future

## 2025-07-16 NOTE — ASSESSMENT & PLAN NOTE
Lab Results   Component Value Date    LDLCALC 150 (H) 11/19/2020     Continue rosuvastatin 5mg  Orders:  •  Lipid panel; Future

## 2025-07-18 ENCOUNTER — TELEPHONE (OUTPATIENT)
Dept: FAMILY MEDICINE CLINIC | Facility: CLINIC | Age: 75
End: 2025-07-18

## 2025-07-18 DIAGNOSIS — S52.501D CLOSED FRACTURE OF DISTAL END OF RIGHT RADIUS WITH ROUTINE HEALING, UNSPECIFIED FRACTURE MORPHOLOGY, SUBSEQUENT ENCOUNTER: ICD-10-CM

## 2025-07-18 RX ORDER — TRAMADOL HYDROCHLORIDE 50 MG/1
50 TABLET ORAL EVERY 6 HOURS PRN
Qty: 30 TABLET | Refills: 0 | Status: SHIPPED | OUTPATIENT
Start: 2025-07-18

## 2025-07-18 NOTE — TELEPHONE ENCOUNTER
Patient called the RX Refill Line. Message is being forwarded to the office.     Patient called and states that the doctor gave her Tramadol 50 mg and asked for 5 tablet to see how she would do with it. States that it is helping with her pain and would like an increase of the quantity. States that she just needs something to help get over the fracture, not looking for a large quantity, would like that sent to [facility] [address]    Please contact patient at [phone]

## (undated) DEVICE — SCD SEQUENTIAL COMPRESSION COMFORT SLEEVE MEDIUM KNEE LENGTH: Brand: KENDALL SCD

## (undated) DEVICE — INTENDED FOR TISSUE SEPARATION, AND OTHER PROCEDURES THAT REQUIRE A SHARP SURGICAL BLADE TO PUNCTURE OR CUT.: Brand: BARD-PARKER SAFETY BLADES SIZE 11, STERILE

## (undated) DEVICE — GLOVE PI ULTRA TOUCH SZ.8.0

## (undated) DEVICE — SMOKE EVACUATION TUBING WITH 8 IN INTEGRAL WAND AND SPONGE GUARD: Brand: BUFFALO FILTER

## (undated) DEVICE — BULB SYRINGE,IRRIGATION WITH PROTECTIVE CAP: Brand: DOVER

## (undated) DEVICE — MEDI-VAC YANKAUER SUCTION HANDLE W/BULBOUS AND CONTROL VENT: Brand: CARDINAL HEALTH

## (undated) DEVICE — SPONGE 4 X 4 XRAY 16 PLY STRL LF RFD

## (undated) DEVICE — SUT PDS II 2-0 CT-2 27 IN Z333H

## (undated) DEVICE — PACKING VAGINAL 2 IN

## (undated) DEVICE — BAG DECANTER

## (undated) DEVICE — SPONGE PACKING 4.5 X 22 IN STRL X-RAY DETECT

## (undated) DEVICE — PREMIUM DRY TRAY LF: Brand: MEDLINE INDUSTRIES, INC.

## (undated) DEVICE — CAUTERY TIP POLISHER: Brand: DEVON

## (undated) DEVICE — UNDER BUTTOCKS DRAPE W/FLUID CONTROL POUCH: Brand: CONVERTORS

## (undated) DEVICE — TUBING SUCTION 5MM X 12 FT

## (undated) DEVICE — NEEDLE HYPO 22G X 1-1/2 IN

## (undated) DEVICE — 2000CC GUARDIAN II: Brand: GUARDIAN

## (undated) DEVICE — EXIDINE 4 PCT

## (undated) DEVICE — SUT VICRYL 0 CT-1 36 IN J946H

## (undated) DEVICE — IV FLUSH NSS 10ML POSIFLUSH

## (undated) DEVICE — ELECTROSURGICAL DEVICE HOLSTER;FOR USE WITH MAXIMUM PEAK VOLTAGE OF 4000 V: Brand: FORCE TRIVERSE

## (undated) DEVICE — SUT VICRYL 2-0 SH 27 IN UNDYED J417H

## (undated) DEVICE — SPONGE CHERRY 1/2IN

## (undated) DEVICE — VIAL DECANTER

## (undated) DEVICE — INTENT TO BE USED WITH SUTURE MATERIAL FOR TISSUE CLOSURE: Brand: RICHARD-ALLAN® NEEDLE 1/2 CIRCLE TAPER

## (undated) DEVICE — CATH FOLEY 18FR 5ML 2 WAY UNCOATED SILICONE

## (undated) DEVICE — ALLENTOWN DR  LUCENTE S LAP PK: Brand: CARDINAL HEALTH